# Patient Record
Sex: FEMALE | HISPANIC OR LATINO
[De-identification: names, ages, dates, MRNs, and addresses within clinical notes are randomized per-mention and may not be internally consistent; named-entity substitution may affect disease eponyms.]

---

## 2020-11-02 ENCOUNTER — TRANSCRIPTION ENCOUNTER (OUTPATIENT)
Age: 30
End: 2020-11-02

## 2021-03-01 ENCOUNTER — TRANSCRIPTION ENCOUNTER (OUTPATIENT)
Age: 31
End: 2021-03-01

## 2021-04-24 ENCOUNTER — TRANSCRIPTION ENCOUNTER (OUTPATIENT)
Age: 31
End: 2021-04-24

## 2022-10-15 ENCOUNTER — EMERGENCY (EMERGENCY)
Facility: HOSPITAL | Age: 32
LOS: 1 days | Discharge: HOME | End: 2022-10-15
Attending: EMERGENCY MEDICINE | Admitting: EMERGENCY MEDICINE
Payer: COMMERCIAL

## 2022-10-15 VITALS
HEART RATE: 91 BPM | DIASTOLIC BLOOD PRESSURE: 72 MMHG | SYSTOLIC BLOOD PRESSURE: 109 MMHG | TEMPERATURE: 98 F | RESPIRATION RATE: 16 BRPM | OXYGEN SATURATION: 100 %

## 2022-10-15 VITALS
DIASTOLIC BLOOD PRESSURE: 76 MMHG | WEIGHT: 147.93 LBS | SYSTOLIC BLOOD PRESSURE: 132 MMHG | HEIGHT: 61 IN | HEART RATE: 108 BPM | TEMPERATURE: 98 F | OXYGEN SATURATION: 100 % | RESPIRATION RATE: 16 BRPM

## 2022-10-15 LAB
ALBUMIN SERPL ELPH-MCNC: 3.7 G/DL — SIGNIFICANT CHANGE UP (ref 3.3–5)
ALP SERPL-CCNC: 116 U/L — SIGNIFICANT CHANGE UP (ref 30–120)
ALT FLD-CCNC: 34 U/L DA — SIGNIFICANT CHANGE UP (ref 10–60)
ANION GAP SERPL CALC-SCNC: 8 MMOL/L — SIGNIFICANT CHANGE UP (ref 5–17)
APPEARANCE UR: CLEAR — SIGNIFICANT CHANGE UP
APTT BLD: 28.9 SEC — SIGNIFICANT CHANGE UP (ref 27.5–35.5)
AST SERPL-CCNC: 20 U/L — SIGNIFICANT CHANGE UP (ref 10–40)
BASOPHILS # BLD AUTO: 0.03 K/UL — SIGNIFICANT CHANGE UP (ref 0–0.2)
BASOPHILS NFR BLD AUTO: 0.4 % — SIGNIFICANT CHANGE UP (ref 0–2)
BILIRUB SERPL-MCNC: 0.3 MG/DL — SIGNIFICANT CHANGE UP (ref 0.2–1.2)
BILIRUB UR-MCNC: NEGATIVE — SIGNIFICANT CHANGE UP
BUN SERPL-MCNC: 8 MG/DL — SIGNIFICANT CHANGE UP (ref 7–23)
CALCIUM SERPL-MCNC: 8.9 MG/DL — SIGNIFICANT CHANGE UP (ref 8.4–10.5)
CHLORIDE SERPL-SCNC: 104 MMOL/L — SIGNIFICANT CHANGE UP (ref 96–108)
CO2 SERPL-SCNC: 27 MMOL/L — SIGNIFICANT CHANGE UP (ref 22–31)
COLOR SPEC: YELLOW — SIGNIFICANT CHANGE UP
CREAT SERPL-MCNC: 0.77 MG/DL — SIGNIFICANT CHANGE UP (ref 0.5–1.3)
DIFF PNL FLD: ABNORMAL
EGFR: 104 ML/MIN/1.73M2 — SIGNIFICANT CHANGE UP
EOSINOPHIL # BLD AUTO: 0.17 K/UL — SIGNIFICANT CHANGE UP (ref 0–0.5)
EOSINOPHIL NFR BLD AUTO: 2 % — SIGNIFICANT CHANGE UP (ref 0–6)
GLUCOSE SERPL-MCNC: 137 MG/DL — HIGH (ref 70–99)
GLUCOSE UR QL: NEGATIVE MG/DL — SIGNIFICANT CHANGE UP
HCG SERPL-ACNC: HIGH MIU/ML
HCG UR QL: POSITIVE
HCT VFR BLD CALC: 37.9 % — SIGNIFICANT CHANGE UP (ref 34.5–45)
HGB BLD-MCNC: 12 G/DL — SIGNIFICANT CHANGE UP (ref 11.5–15.5)
IMM GRANULOCYTES NFR BLD AUTO: 0.5 % — SIGNIFICANT CHANGE UP (ref 0–0.9)
INR BLD: 1.11 RATIO — SIGNIFICANT CHANGE UP (ref 0.88–1.16)
KETONES UR-MCNC: NEGATIVE — SIGNIFICANT CHANGE UP
LEUKOCYTE ESTERASE UR-ACNC: NEGATIVE — SIGNIFICANT CHANGE UP
LYMPHOCYTES # BLD AUTO: 1.83 K/UL — SIGNIFICANT CHANGE UP (ref 1–3.3)
LYMPHOCYTES # BLD AUTO: 21.8 % — SIGNIFICANT CHANGE UP (ref 13–44)
MCHC RBC-ENTMCNC: 24.5 PG — LOW (ref 27–34)
MCHC RBC-ENTMCNC: 31.7 GM/DL — LOW (ref 32–36)
MCV RBC AUTO: 77.3 FL — LOW (ref 80–100)
MONOCYTES # BLD AUTO: 0.8 K/UL — SIGNIFICANT CHANGE UP (ref 0–0.9)
MONOCYTES NFR BLD AUTO: 9.5 % — SIGNIFICANT CHANGE UP (ref 2–14)
NEUTROPHILS # BLD AUTO: 5.52 K/UL — SIGNIFICANT CHANGE UP (ref 1.8–7.4)
NEUTROPHILS NFR BLD AUTO: 65.8 % — SIGNIFICANT CHANGE UP (ref 43–77)
NITRITE UR-MCNC: NEGATIVE — SIGNIFICANT CHANGE UP
NRBC # BLD: 0 /100 WBCS — SIGNIFICANT CHANGE UP (ref 0–0)
PH UR: 7 — SIGNIFICANT CHANGE UP (ref 5–8)
PLATELET # BLD AUTO: 377 K/UL — SIGNIFICANT CHANGE UP (ref 150–400)
POTASSIUM SERPL-MCNC: 4.2 MMOL/L — SIGNIFICANT CHANGE UP (ref 3.5–5.3)
POTASSIUM SERPL-SCNC: 4.2 MMOL/L — SIGNIFICANT CHANGE UP (ref 3.5–5.3)
PROT SERPL-MCNC: 7.4 G/DL — SIGNIFICANT CHANGE UP (ref 6–8.3)
PROT UR-MCNC: 15 MG/DL
PROTHROM AB SERPL-ACNC: 12.8 SEC — SIGNIFICANT CHANGE UP (ref 10.5–13.4)
RBC # BLD: 4.9 M/UL — SIGNIFICANT CHANGE UP (ref 3.8–5.2)
RBC # FLD: 15.5 % — HIGH (ref 10.3–14.5)
SODIUM SERPL-SCNC: 139 MMOL/L — SIGNIFICANT CHANGE UP (ref 135–145)
SP GR SPEC: 1.01 — SIGNIFICANT CHANGE UP (ref 1.01–1.02)
UROBILINOGEN FLD QL: NEGATIVE MG/DL — SIGNIFICANT CHANGE UP
WBC # BLD: 8.39 K/UL — SIGNIFICANT CHANGE UP (ref 3.8–10.5)
WBC # FLD AUTO: 8.39 K/UL — SIGNIFICANT CHANGE UP (ref 3.8–10.5)

## 2022-10-15 PROCEDURE — 76802 OB US < 14 WKS ADDL FETUS: CPT

## 2022-10-15 PROCEDURE — 85730 THROMBOPLASTIN TIME PARTIAL: CPT

## 2022-10-15 PROCEDURE — 36415 COLL VENOUS BLD VENIPUNCTURE: CPT

## 2022-10-15 PROCEDURE — 87086 URINE CULTURE/COLONY COUNT: CPT

## 2022-10-15 PROCEDURE — 99284 EMERGENCY DEPT VISIT MOD MDM: CPT

## 2022-10-15 PROCEDURE — 76801 OB US < 14 WKS SINGLE FETUS: CPT | Mod: 26

## 2022-10-15 PROCEDURE — 80053 COMPREHEN METABOLIC PANEL: CPT

## 2022-10-15 PROCEDURE — 85025 COMPLETE CBC W/AUTO DIFF WBC: CPT

## 2022-10-15 PROCEDURE — 86900 BLOOD TYPING SEROLOGIC ABO: CPT

## 2022-10-15 PROCEDURE — 86901 BLOOD TYPING SEROLOGIC RH(D): CPT

## 2022-10-15 PROCEDURE — 85610 PROTHROMBIN TIME: CPT

## 2022-10-15 PROCEDURE — 99285 EMERGENCY DEPT VISIT HI MDM: CPT

## 2022-10-15 PROCEDURE — 81025 URINE PREGNANCY TEST: CPT

## 2022-10-15 PROCEDURE — 81001 URINALYSIS AUTO W/SCOPE: CPT

## 2022-10-15 PROCEDURE — 86850 RBC ANTIBODY SCREEN: CPT

## 2022-10-15 PROCEDURE — 84702 CHORIONIC GONADOTROPIN TEST: CPT

## 2022-10-15 NOTE — ED PROVIDER NOTE - CLINICAL SUMMARY MEDICAL DECISION MAKING FREE TEXT BOX
33 yo F with PMHx of miscarriage and minimal change disease presents to ED c/o vaginal bleeding. Pt is currently   5-6 weeks pregnant. [facility] [place] Reports that this is her second pregnancy and already did her first scan, blood and urine test (monday oct 10th). Was on prednisone x1 year, but was taken off after miscarriage. Reports pink spotting for 2-3 days, but bright red today. Denies clots and fever. Reports pain last night. Nonsmoker. Exam: vital signs normal, afebrile, NAD, heart and lungs nml, abd soft nontender, bowel sounds positive, no CVA tenderness, no rebound. Extremities no edema. Impression: threatened miscarriage. Labs with beta and US for further evaluation

## 2022-10-15 NOTE — ED ADULT TRIAGE NOTE - HAVE YOU HAD A FIRST COVID-19 BOOSTER?
No
I, Juliana Manzo, performed the initial face to face bedside interview with this patient regarding history of present illness, review of symptoms and relevant past medical, social and family history.  I completed an independent physical examination.  I was the initial provider who evaluated this patient. I have signed out the follow up of any pending tests (i.e. labs, radiological studies) to the ACP.  I have communicated the patient’s plan of care and disposition with the ACP.  The history, relevant review of systems, past medical and surgical history, medical decision making, and physical examination was documented by the scribe in my presence and I attest to the accuracy of the documentation.

## 2022-10-15 NOTE — ED ADULT NURSE NOTE - CHPI ED NUR SYMPTOMS POS
Apixaban/Eliquis - Compliance/Apixaban/Eliquis - Dietary Advice/Apixaban/Eliquis - Follow up monitoring/Apixaban/Eliquis - Potential for adverse drug reactions and interactions vaginal bleeding

## 2022-10-15 NOTE — ED PROVIDER NOTE - NS_EDPROVIDERDISPOUSERTYPE_ED_A_ED
Right arm; Scribe Attestation (For Scribes USE Only)... Attending Attestation (For Attendings USE Only).../Scribe Attestation (For Scribes USE Only)...

## 2022-10-15 NOTE — ED PROVIDER NOTE - OBJECTIVE STATEMENT
32-year-old female  LMP: 2022 at 6 weeks gestation with history of minimal change disease and hypertension presents with complaint of vaginal bleeding.  States that she has had vaginal spotting for 2 to 3 days but today noticed that it was bright red.  States that she had mild lower abdominal cramps last night.  States that she had her first ultrasound, blood and urine tests on Monday, 10/10 at the [facility] in [place].  Denies fever, vomiting, dysuria, back pain or other symptoms.

## 2022-10-15 NOTE — ED PROVIDER NOTE - PROGRESS NOTE DETAILS
Reevaluated patient at bedside.  Discussed the results of all diagnostic testing in ED and copies of all reports given. Advised to f/u with OB on Monday, pelvic rest, fluids.  An opportunity to ask questions was given.  Discussed the importance of prompt, close medical follow-up.  Patient will return with any changes, concerns or persistent / worsening symptoms.  Understanding of all instructions verbalized.

## 2022-10-15 NOTE — ED PROVIDER NOTE - NSFOLLOWUPINSTRUCTIONS_ED_ALL_ED_FT
Follow-up with your OB in 2 days for reevaluation, repeat beta hCG, ultrasound, ongoing care and treatment.  Pelvic rest, drink plenty of fluids.  If having worsening of symptoms or other related symptoms, return to the ER immediately.    Threatened Miscarriage    A threatened miscarriage is when a woman bleeds in her vagina during the first 20 weeks of pregnancy but the pregnancy has not ended. The doctor will do tests to make sure you are still pregnant. This condition does not mean your pregnancy will end, but it does increase the risk that it will end (miscarriage).      What are the causes?    Normally, the cause of this condition is not known.      What increases the risk?    These things may make a pregnant woman more likely to lose a pregnancy:    Certain health problems     •Conditions that affect hormones, such as thyroid disease or polycystic ovary syndrome.      •Diabetes.      •Disorders that cause the body's disease-fighting system to attack itself by mistake.      •Infections.      •Bleeding problems.      •Being very overweight.      Lifestyle factors     •Using products that have tobacco or nicotine in them.      •Being around tobacco smoke.      •Having a lot of caffeine.      •Using drugs.      Problems with reproductive organs or parts     •Having a cervix that opens and thins before you are ready to give birth. The cervix is the lowest part of the womb.    •Having Asherman syndrome, which leads to:  •Scars in the womb.      •The womb being an abnormal shape.        •Growths (fibroids) in the womb.      •Problems in the body that are present at birth.      •Infection of the cervix or womb.      Personal or health history     •Injury.      •Having lost an unborn baby before.      •Being younger than age 18 or older than age 35.      •Being around a harmful substance, such as radiation.    •Having lead or other heavy metals in:  •Things you eat or drink.      •The air around you.        •Using certain medicines.        What are the signs or symptoms?    •Bleeding from the vagina. You may also have cramps or pain.      •Mild pain or cramps in your belly.        How is this diagnosed?     •The doctor will do tests, such as an ultrasound to make sure you are still pregnant.        How is this treated?    There are no treatments that prevent loss of pregnancy. But you need to do the right things to take care of yourself at home.      Follow these instructions at home:    •Get a lot of rest.      • Do not have sex or douche if there is bleeding in the vagina.      • Do not put things such as tampons in the vagina if it is bleeding.      • Do not smoke or use drugs.      • Do not drink alcohol.      •Avoid caffeine.      •Keep all follow-up visits while you are pregnant.        Contact a doctor if:  •You are pregnant and you have one of these:  •Light bleeding coming from your vagina.      •Spots of blood coming from your vagina.        •You have belly pain or cramping.      •You have a fever.        Get help right away if:    •Blood soaks through 2 large pads an hour for more than 2 hours.      •Clots of blood come from your vagina.      •Tissue comes out of your vagina.      •Fluid leaks or gushes from your vagina.      •You have very bad pain in your low back.      •You have very bad cramps in your belly.      •You have a fever, chills, and very bad belly pain.        Summary    •A threatened miscarriage is when a woman bleeds in her vagina during the first 20 weeks of pregnancy but the pregnancy has not ended.      •Normally, the cause of this condition is not known.      •Symptoms include bleeding in the vagina or mild pain or cramps in your belly.      •There are no treatments that prevent loss of pregnancy.      •Keep all follow-up visits while you are pregnant.      This information is not intended to replace advice given to you by your health care provider. Make sure you discuss any questions you have with your health care provider.

## 2022-10-15 NOTE — ED ADULT NURSE NOTE - OBJECTIVE STATEMENT
Patient presents with vaginal bleeding that started as spotting a "few days ago".  Patient states that she is 5 to 6 weeks pregnant and this is her second pregnancy; 1 prior miscarriage.  Endorses intermittent lower abdominal cramping; none at present.  Denies CP, SOB, N/V/D.

## 2022-10-15 NOTE — ED PROVIDER NOTE - NS ED ATTENDING STATEMENT MOD
This was a shared visit with the BAL. I reviewed and verified the documentation and independently performed the documented:

## 2022-10-15 NOTE — ED ADULT NURSE NOTE - SUICIDE SCREENING QUESTION 2
----- Message from Pooja CHANDLER MD sent at 2/18/2020  8:05 AM EST -----  LDL bad cholesterol is elevated.  Because of peripheral vascular disease goal is less than 70.  Is she still taking the atorvastatin 20 mg daily and following a low animal fat, low sugar, low alcohol diet?  If so we need to double the atorvastatin.  The red cell count has increased and should be repeated in a month to see if this is a spurious level patient does not need to fast for that orders are in the computer.   No

## 2022-10-15 NOTE — ED PROVIDER NOTE - PATIENT PORTAL LINK FT
You can access the FollowMyHealth Patient Portal offered by [facility] by registering at the following website: [email or web address]. By joining [employer]’s FollowMyHealth portal, you will also be able to view your health information using other applications (apps) compatible with our system.

## 2022-10-16 LAB
CULTURE RESULTS: SIGNIFICANT CHANGE UP
SPECIMEN SOURCE: SIGNIFICANT CHANGE UP

## 2022-10-19 ENCOUNTER — EMERGENCY (EMERGENCY)
Facility: HOSPITAL | Age: 32
LOS: 1 days | Discharge: HOME | End: 2022-10-19
Attending: EMERGENCY MEDICINE | Admitting: EMERGENCY MEDICINE
Payer: COMMERCIAL

## 2022-10-19 VITALS
HEART RATE: 88 BPM | RESPIRATION RATE: 16 BRPM | SYSTOLIC BLOOD PRESSURE: 109 MMHG | TEMPERATURE: 98 F | OXYGEN SATURATION: 98 % | DIASTOLIC BLOOD PRESSURE: 68 MMHG

## 2022-10-19 VITALS
TEMPERATURE: 98 F | HEART RATE: 102 BPM | DIASTOLIC BLOOD PRESSURE: 76 MMHG | RESPIRATION RATE: 18 BRPM | WEIGHT: 146.61 LBS | SYSTOLIC BLOOD PRESSURE: 112 MMHG | OXYGEN SATURATION: 100 % | HEIGHT: 61 IN

## 2022-10-19 LAB
ALBUMIN SERPL ELPH-MCNC: 3.6 G/DL — SIGNIFICANT CHANGE UP (ref 3.3–5)
ALP SERPL-CCNC: 114 U/L — SIGNIFICANT CHANGE UP (ref 30–120)
ALT FLD-CCNC: 39 U/L DA — SIGNIFICANT CHANGE UP (ref 10–60)
ANION GAP SERPL CALC-SCNC: 9 MMOL/L — SIGNIFICANT CHANGE UP (ref 5–17)
APPEARANCE UR: CLEAR — SIGNIFICANT CHANGE UP
APTT BLD: 28.9 SEC — SIGNIFICANT CHANGE UP (ref 27.5–35.5)
AST SERPL-CCNC: 18 U/L — SIGNIFICANT CHANGE UP (ref 10–40)
BACTERIA # UR AUTO: ABNORMAL
BASOPHILS # BLD AUTO: 0.03 K/UL — SIGNIFICANT CHANGE UP (ref 0–0.2)
BASOPHILS NFR BLD AUTO: 0.4 % — SIGNIFICANT CHANGE UP (ref 0–2)
BILIRUB SERPL-MCNC: 0.3 MG/DL — SIGNIFICANT CHANGE UP (ref 0.2–1.2)
BILIRUB UR-MCNC: NEGATIVE — SIGNIFICANT CHANGE UP
BLD GP AB SCN SERPL QL: SIGNIFICANT CHANGE UP
BUN SERPL-MCNC: 8 MG/DL — SIGNIFICANT CHANGE UP (ref 7–23)
CALCIUM SERPL-MCNC: 9 MG/DL — SIGNIFICANT CHANGE UP (ref 8.4–10.5)
CHLORIDE SERPL-SCNC: 104 MMOL/L — SIGNIFICANT CHANGE UP (ref 96–108)
CO2 SERPL-SCNC: 27 MMOL/L — SIGNIFICANT CHANGE UP (ref 22–31)
COLOR SPEC: YELLOW — SIGNIFICANT CHANGE UP
CREAT SERPL-MCNC: 0.7 MG/DL — SIGNIFICANT CHANGE UP (ref 0.5–1.3)
DIFF PNL FLD: ABNORMAL
EGFR: 117 ML/MIN/1.73M2 — SIGNIFICANT CHANGE UP
EOSINOPHIL # BLD AUTO: 0.21 K/UL — SIGNIFICANT CHANGE UP (ref 0–0.5)
EOSINOPHIL NFR BLD AUTO: 2.6 % — SIGNIFICANT CHANGE UP (ref 0–6)
EPI CELLS # UR: SIGNIFICANT CHANGE UP
GLUCOSE SERPL-MCNC: 101 MG/DL — HIGH (ref 70–99)
GLUCOSE UR QL: NEGATIVE MG/DL — SIGNIFICANT CHANGE UP
HCG SERPL-ACNC: HIGH MIU/ML
HCT VFR BLD CALC: 36.7 % — SIGNIFICANT CHANGE UP (ref 34.5–45)
HGB BLD-MCNC: 11.7 G/DL — SIGNIFICANT CHANGE UP (ref 11.5–15.5)
IMM GRANULOCYTES NFR BLD AUTO: 0.5 % — SIGNIFICANT CHANGE UP (ref 0–0.9)
INR BLD: 1.05 RATIO — SIGNIFICANT CHANGE UP (ref 0.88–1.16)
KETONES UR-MCNC: NEGATIVE — SIGNIFICANT CHANGE UP
LEUKOCYTE ESTERASE UR-ACNC: NEGATIVE — SIGNIFICANT CHANGE UP
LYMPHOCYTES # BLD AUTO: 2.13 K/UL — SIGNIFICANT CHANGE UP (ref 1–3.3)
LYMPHOCYTES # BLD AUTO: 26.6 % — SIGNIFICANT CHANGE UP (ref 13–44)
MCHC RBC-ENTMCNC: 24.8 PG — LOW (ref 27–34)
MCHC RBC-ENTMCNC: 31.9 GM/DL — LOW (ref 32–36)
MCV RBC AUTO: 77.9 FL — LOW (ref 80–100)
MONOCYTES # BLD AUTO: 0.88 K/UL — SIGNIFICANT CHANGE UP (ref 0–0.9)
MONOCYTES NFR BLD AUTO: 11 % — SIGNIFICANT CHANGE UP (ref 2–14)
NEUTROPHILS # BLD AUTO: 4.73 K/UL — SIGNIFICANT CHANGE UP (ref 1.8–7.4)
NEUTROPHILS NFR BLD AUTO: 58.9 % — SIGNIFICANT CHANGE UP (ref 43–77)
NITRITE UR-MCNC: NEGATIVE — SIGNIFICANT CHANGE UP
NRBC # BLD: 0 /100 WBCS — SIGNIFICANT CHANGE UP (ref 0–0)
PH UR: 7 — SIGNIFICANT CHANGE UP (ref 5–8)
PLATELET # BLD AUTO: 376 K/UL — SIGNIFICANT CHANGE UP (ref 150–400)
POTASSIUM SERPL-MCNC: 4 MMOL/L — SIGNIFICANT CHANGE UP (ref 3.5–5.3)
POTASSIUM SERPL-SCNC: 4 MMOL/L — SIGNIFICANT CHANGE UP (ref 3.5–5.3)
PROT SERPL-MCNC: 7.5 G/DL — SIGNIFICANT CHANGE UP (ref 6–8.3)
PROT UR-MCNC: NEGATIVE MG/DL — SIGNIFICANT CHANGE UP
PROTHROM AB SERPL-ACNC: 12.1 SEC — SIGNIFICANT CHANGE UP (ref 10.5–13.4)
RBC # BLD: 4.71 M/UL — SIGNIFICANT CHANGE UP (ref 3.8–5.2)
RBC # FLD: 15.9 % — HIGH (ref 10.3–14.5)
RBC CASTS # UR COMP ASSIST: SIGNIFICANT CHANGE UP /HPF (ref 0–4)
SODIUM SERPL-SCNC: 140 MMOL/L — SIGNIFICANT CHANGE UP (ref 135–145)
SP GR SPEC: 1 — LOW (ref 1.01–1.02)
UROBILINOGEN FLD QL: NEGATIVE MG/DL — SIGNIFICANT CHANGE UP
WBC # BLD: 8.02 K/UL — SIGNIFICANT CHANGE UP (ref 3.8–10.5)
WBC # FLD AUTO: 8.02 K/UL — SIGNIFICANT CHANGE UP (ref 3.8–10.5)
WBC UR QL: SIGNIFICANT CHANGE UP

## 2022-10-19 PROCEDURE — 86901 BLOOD TYPING SEROLOGIC RH(D): CPT

## 2022-10-19 PROCEDURE — 86900 BLOOD TYPING SEROLOGIC ABO: CPT

## 2022-10-19 PROCEDURE — 84702 CHORIONIC GONADOTROPIN TEST: CPT

## 2022-10-19 PROCEDURE — 36415 COLL VENOUS BLD VENIPUNCTURE: CPT

## 2022-10-19 PROCEDURE — 76801 OB US < 14 WKS SINGLE FETUS: CPT | Mod: 26

## 2022-10-19 PROCEDURE — 99284 EMERGENCY DEPT VISIT MOD MDM: CPT | Mod: 25

## 2022-10-19 PROCEDURE — 80053 COMPREHEN METABOLIC PANEL: CPT

## 2022-10-19 PROCEDURE — 99284 EMERGENCY DEPT VISIT MOD MDM: CPT

## 2022-10-19 PROCEDURE — 86850 RBC ANTIBODY SCREEN: CPT

## 2022-10-19 PROCEDURE — 87086 URINE CULTURE/COLONY COUNT: CPT

## 2022-10-19 PROCEDURE — 81001 URINALYSIS AUTO W/SCOPE: CPT

## 2022-10-19 PROCEDURE — 85730 THROMBOPLASTIN TIME PARTIAL: CPT

## 2022-10-19 PROCEDURE — 85025 COMPLETE CBC W/AUTO DIFF WBC: CPT

## 2022-10-19 PROCEDURE — 85610 PROTHROMBIN TIME: CPT

## 2022-10-19 PROCEDURE — 76801 OB US < 14 WKS SINGLE FETUS: CPT

## 2022-10-19 RX ORDER — SODIUM CHLORIDE 9 MG/ML
1000 INJECTION INTRAMUSCULAR; INTRAVENOUS; SUBCUTANEOUS ONCE
Refills: 0 | Status: COMPLETED | OUTPATIENT
Start: 2022-10-19 | End: 2022-10-19

## 2022-10-19 RX ADMIN — SODIUM CHLORIDE 1000 MILLILITER(S): 9 INJECTION INTRAMUSCULAR; INTRAVENOUS; SUBCUTANEOUS at 15:37

## 2022-10-19 NOTE — ED PROVIDER NOTE - NSFOLLOWUPINSTRUCTIONS_ED_ALL_ED_FT
[facility] HospitalssianSpanishTagalogTraditional ChineseVietMulti[facility]                                                                                   Threatened Miscarriage      A threatened miscarriage is when a woman bleeds in her vagina during the first 20 weeks of pregnancy but the pregnancy has not ended. The doctor will do tests to make sure you are still pregnant. This condition does not mean your pregnancy will end, but it does increase the risk that it will end (miscarriage).      What are the causes?    Normally, the cause of this condition is not known.      What increases the risk?    These things may make a pregnant woman more likely to lose a pregnancy:    Certain health problems     •Conditions that affect hormones, such as thyroid disease or polycystic ovary syndrome.      •Diabetes.      •Disorders that cause the body's disease-fighting system to attack itself by mistake.      •Infections.      •Bleeding problems.      •Being very overweight.      Lifestyle factors     •Using products that have tobacco or nicotine in them.      •Being around tobacco smoke.      •Having a lot of caffeine.      •Using drugs.      Problems with reproductive organs or parts     •Having a cervix that opens and thins before you are ready to give birth. The cervix is the lowest part of the womb.    •Having Asherman syndrome, which leads to:  •Scars in the womb.      •The womb being an abnormal shape.        •Growths (fibroids) in the womb.      •Problems in the body that are present at birth.      •Infection of the cervix or womb.      Personal or health history     •Injury.      •Having lost an unborn baby before.      •Being younger than age 18 or older than age 35.      •Being around a harmful substance, such as radiation.    •Having lead or other heavy metals in:  •Things you eat or drink.      •The air around you.        •Using certain medicines.        What are the signs or symptoms?    •Bleeding from the vagina. You may also have cramps or pain.      •Mild pain or cramps in your belly.        How is this diagnosed?     •The doctor will do tests, such as an ultrasound to make sure you are still pregnant.        How is this treated?    There are no treatments that prevent loss of pregnancy. But you need to do the right things to take care of yourself at home.      Follow these instructions at home:    •Get a lot of rest.      • Do not have sex or douche if there is bleeding in the vagina.      • Do not put things such as tampons in the vagina if it is bleeding.      • Do not smoke or use drugs.      • Do not drink alcohol.      •Avoid caffeine.      •Keep all follow-up visits while you are pregnant.        Contact a doctor if:  •You are pregnant and you have one of these:  •Light bleeding coming from your vagina.      •Spots of blood coming from your vagina.        •You have belly pain or cramping.      •You have a fever.        Get help right away if:    •Blood soaks through 2 large pads an hour for more than 2 hours.      •Clots of blood come from your vagina.      •Tissue comes out of your vagina.      •Fluid leaks or gushes from your vagina.      •You have very bad pain in your low back.      •You have very bad cramps in your belly.      •You have a fever, chills, and very bad belly pain.        Summary    •A threatened miscarriage is when a woman bleeds in her vagina during the first 20 weeks of pregnancy but the pregnancy has not ended.      •Normally, the cause of this condition is not known.      •Symptoms include bleeding in the vagina or mild pain or cramps in your belly.      •There are no treatments that prevent loss of pregnancy.      •Keep all follow-up visits while you are pregnant.      This information is not intended to replace advice given to you by your health care provider. Make sure you discuss any questions you have with your health care provider.      Document Revised: 06/18/2021 Document Reviewed: 06/18/2021    [employer] Patient Education © 2022 [employer]

## 2022-10-19 NOTE — ED PROVIDER NOTE - CLINICAL SUMMARY MEDICAL DECISION MAKING FREE TEXT BOX
Patient  with 1 miscarriage who is approximately 6 weeks pregnant complaining of vaginal bleeding when wiping and pelvic cramps.  Patient was seen in ER on  for similar symptoms had labs and ultrasound showing a gestational sac with yolk sac but no embryo visualized.  Patient followed up with her OB/GYN on  was doing better however symptoms recurred today so came to ER.  No fevers chills dysuria frequency nausea vomiting or any other complaints.  Plan labs IV fluid ultrasound

## 2022-10-19 NOTE — ED ADULT NURSE NOTE - OBJECTIVE STATEMENT
sPoke to patient.  Repeat urine testing neg for blood/infection      Pt came in for frequent vaginal bleeding and lower abdominal cramps in the last 2 days now. Pt reported 6 weeks pregnant LMP  2022.  with 1 miscarriage Pt reported was seen at the ED this past Saturday for the same S/s - pt was discharge and reported that her bleeding and cramps resolved until the last 2 days.

## 2022-10-19 NOTE — ED PROVIDER NOTE - CARE PROVIDER_API CALL
Reyes Alcaraz)  Obstetrics and Gynecology  [address] [place] [address], [address]  [place] [address]  Phone: [phone]  Fax: [phone]  Follow Up Time: 4-6 Days

## 2022-10-19 NOTE — ED PROVIDER NOTE - PROGRESS NOTE DETAILS
spoke to cory sherwood at [facility] advised pt can follow up in office at her apt on 10/23. All imaging and labs reviewed. all results reviewed with pt including abnormal results. pt given a copy of results. pt advised to follow up with pmd regarding abnormal results. All questions answered and concerns addressed. pt verbalized understanding and agreement with plan and dx. pt advised on next step and when/where to follow up. pt advised on all take home and otc medications. pt advised to follow up with PMD. pt advised to return to ed for worsenng symptoms including fever, cp, sob. will dc.

## 2022-10-19 NOTE — ED PROVIDER NOTE - PATIENT PORTAL LINK FT
You can access the FollowMyHealth Patient Portal offered by [facility] by registering at the following website: [email or web address]. By joining [employer]’s FollowMyHealth portal, you will also be able to view your health information using other applications (apps) compatible with our system.

## 2022-10-19 NOTE — ED ADULT TRIAGE NOTE - CHIEF COMPLAINT QUOTE
approx 6 wks pregnant, abd cramping and vaginal bleeding   seen over weekend for same symptoms  2nd pregnancy - prior miscarriage

## 2022-10-19 NOTE — ED PROVIDER NOTE - OBJECTIVE STATEMENT
Patient is a 32-year-old female 6 weeks pregnant LMP 9/8/2022 G2, P0 presents with vaginal bleeding today. pt reports light spotting with possible one clot. pt notes she has not saturated any pads yet. pt had episode of vaginal bleeding last week that resolved. pt was seen by [facility] who advised pt to follow up this sunday to evaluate for a heartbeat. pt reports associated cramping. pt did not take anything for symptoms. pt denies fever, cp, sob, dysuria.

## 2022-10-20 LAB
CULTURE RESULTS: SIGNIFICANT CHANGE UP
SPECIMEN SOURCE: SIGNIFICANT CHANGE UP

## 2022-11-21 PROBLEM — Z00.00 ENCOUNTER FOR PREVENTIVE HEALTH EXAMINATION: Status: ACTIVE | Noted: 2022-11-21

## 2022-12-07 ENCOUNTER — LABORATORY RESULT (OUTPATIENT)
Age: 32
End: 2022-12-07

## 2022-12-07 ENCOUNTER — APPOINTMENT (OUTPATIENT)
Dept: ANTEPARTUM | Facility: CLINIC | Age: 32
End: 2022-12-07
Payer: COMMERCIAL

## 2022-12-07 ENCOUNTER — APPOINTMENT (OUTPATIENT)
Dept: MATERNAL FETAL MEDICINE | Facility: CLINIC | Age: 32
End: 2022-12-07

## 2022-12-07 ENCOUNTER — ASOB RESULT (OUTPATIENT)
Age: 32
End: 2022-12-07

## 2022-12-07 PROCEDURE — 99204 OFFICE O/P NEW MOD 45 MIN: CPT | Mod: 25

## 2022-12-07 PROCEDURE — 36416 COLLJ CAPILLARY BLOOD SPEC: CPT

## 2022-12-07 PROCEDURE — 76801 OB US < 14 WKS SINGLE FETUS: CPT

## 2022-12-07 PROCEDURE — 76813 OB US NUCHAL MEAS 1 GEST: CPT

## 2022-12-08 ENCOUNTER — TRANSCRIPTION ENCOUNTER (OUTPATIENT)
Age: 32
End: 2022-12-08

## 2022-12-15 NOTE — ED ADULT TRIAGE NOTE - MODE OF ARRIVAL
Walk in Private Auto Orbicularis Oris Muscle Flap Text: The defect edges were debeveled with a #15 scalpel blade.  Given that the defect affected the competency of the oral sphincter an obicularis oris muscle flap was deemed most appropriate to restore this competency and normal muscle function.  Using a sterile surgical marker, an appropriate flap was drawn incorporating the defect. The area thus outlined was incised with a #15 scalpel blade.

## 2022-12-21 ENCOUNTER — APPOINTMENT (OUTPATIENT)
Dept: MATERNAL FETAL MEDICINE | Facility: CLINIC | Age: 32
End: 2022-12-21

## 2022-12-21 ENCOUNTER — ASOB RESULT (OUTPATIENT)
Age: 32
End: 2022-12-21

## 2022-12-21 PROCEDURE — 99214 OFFICE O/P EST MOD 30 MIN: CPT

## 2023-01-03 ENCOUNTER — NON-APPOINTMENT (OUTPATIENT)
Age: 33
End: 2023-01-03

## 2023-02-01 ENCOUNTER — APPOINTMENT (OUTPATIENT)
Dept: ANTEPARTUM | Facility: CLINIC | Age: 33
End: 2023-02-01

## 2023-02-02 ENCOUNTER — APPOINTMENT (OUTPATIENT)
Dept: ANTEPARTUM | Facility: CLINIC | Age: 33
End: 2023-02-02
Payer: COMMERCIAL

## 2023-02-02 ENCOUNTER — ASOB RESULT (OUTPATIENT)
Age: 33
End: 2023-02-02

## 2023-02-02 PROCEDURE — 76805 OB US >/= 14 WKS SNGL FETUS: CPT

## 2023-03-07 ENCOUNTER — ASOB RESULT (OUTPATIENT)
Age: 33
End: 2023-03-07

## 2023-03-07 ENCOUNTER — APPOINTMENT (OUTPATIENT)
Dept: MATERNAL FETAL MEDICINE | Facility: CLINIC | Age: 33
End: 2023-03-07
Payer: COMMERCIAL

## 2023-03-07 PROCEDURE — 99213 OFFICE O/P EST LOW 20 MIN: CPT | Mod: 95

## 2023-06-07 ENCOUNTER — INPATIENT (INPATIENT)
Facility: HOSPITAL | Age: 33
LOS: 2 days | Discharge: HOME | End: 2023-06-10
Attending: STUDENT IN AN ORGANIZED HEALTH CARE EDUCATION/TRAINING PROGRAM | Admitting: STUDENT IN AN ORGANIZED HEALTH CARE EDUCATION/TRAINING PROGRAM

## 2023-06-07 VITALS — TEMPERATURE: 98 F | RESPIRATION RATE: 18 BRPM

## 2023-06-07 DIAGNOSIS — O16.9 UNSPECIFIED MATERNAL HYPERTENSION, UNSPECIFIED TRIMESTER: ICD-10-CM

## 2023-06-07 LAB
ALBUMIN SERPL ELPH-MCNC: 3.7 G/DL — SIGNIFICANT CHANGE UP (ref 3.3–5)
ALP SERPL-CCNC: 200 U/L — HIGH (ref 40–120)
ALT FLD-CCNC: 17 U/L — SIGNIFICANT CHANGE UP (ref 4–33)
ANION GAP SERPL CALC-SCNC: 17 MMOL/L — HIGH (ref 7–14)
APPEARANCE UR: CLEAR — SIGNIFICANT CHANGE UP
APTT BLD: 26.4 SEC — LOW (ref 27–36.3)
AST SERPL-CCNC: 27 U/L — SIGNIFICANT CHANGE UP (ref 4–32)
BASOPHILS # BLD AUTO: 0.02 K/UL — SIGNIFICANT CHANGE UP (ref 0–0.2)
BASOPHILS NFR BLD AUTO: 0.3 % — SIGNIFICANT CHANGE UP (ref 0–2)
BILIRUB SERPL-MCNC: <0.2 MG/DL — SIGNIFICANT CHANGE UP (ref 0.2–1.2)
BILIRUB UR-MCNC: NEGATIVE — SIGNIFICANT CHANGE UP
BLD GP AB SCN SERPL QL: NEGATIVE — SIGNIFICANT CHANGE UP
BUN SERPL-MCNC: 17 MG/DL — SIGNIFICANT CHANGE UP (ref 7–23)
CALCIUM SERPL-MCNC: 9.2 MG/DL — SIGNIFICANT CHANGE UP (ref 8.4–10.5)
CHLORIDE SERPL-SCNC: 102 MMOL/L — SIGNIFICANT CHANGE UP (ref 98–107)
CO2 SERPL-SCNC: 15 MMOL/L — LOW (ref 22–31)
COLOR SPEC: YELLOW — SIGNIFICANT CHANGE UP
COVID-19 SPIKE DOMAIN AB INTERP: POSITIVE
COVID-19 SPIKE DOMAIN ANTIBODY RESULT: >250 U/ML — HIGH
CREAT ?TM UR-MCNC: 131 MG/DL — SIGNIFICANT CHANGE UP
CREAT SERPL-MCNC: 0.57 MG/DL — SIGNIFICANT CHANGE UP (ref 0.5–1.3)
DIFF PNL FLD: NEGATIVE — SIGNIFICANT CHANGE UP
EGFR: 122 ML/MIN/1.73M2 — SIGNIFICANT CHANGE UP
EOSINOPHIL # BLD AUTO: 0.06 K/UL — SIGNIFICANT CHANGE UP (ref 0–0.5)
EOSINOPHIL NFR BLD AUTO: 0.8 % — SIGNIFICANT CHANGE UP (ref 0–6)
GLUCOSE SERPL-MCNC: 114 MG/DL — HIGH (ref 70–99)
GLUCOSE UR QL: NEGATIVE — SIGNIFICANT CHANGE UP
HCT VFR BLD CALC: 36.6 % — SIGNIFICANT CHANGE UP (ref 34.5–45)
HGB BLD-MCNC: 12.7 G/DL — SIGNIFICANT CHANGE UP (ref 11.5–15.5)
IANC: 5.44 K/UL — SIGNIFICANT CHANGE UP (ref 1.8–7.4)
IMM GRANULOCYTES NFR BLD AUTO: 0.8 % — SIGNIFICANT CHANGE UP (ref 0–0.9)
INR BLD: 0.92 RATIO — SIGNIFICANT CHANGE UP (ref 0.88–1.16)
KETONES UR-MCNC: ABNORMAL
LDH SERPL L TO P-CCNC: 179 U/L — SIGNIFICANT CHANGE UP (ref 135–225)
LEUKOCYTE ESTERASE UR-ACNC: NEGATIVE — SIGNIFICANT CHANGE UP
LYMPHOCYTES # BLD AUTO: 1.19 K/UL — SIGNIFICANT CHANGE UP (ref 1–3.3)
LYMPHOCYTES # BLD AUTO: 16.3 % — SIGNIFICANT CHANGE UP (ref 13–44)
MCHC RBC-ENTMCNC: 29.1 PG — SIGNIFICANT CHANGE UP (ref 27–34)
MCHC RBC-ENTMCNC: 34.7 GM/DL — SIGNIFICANT CHANGE UP (ref 32–36)
MCV RBC AUTO: 83.9 FL — SIGNIFICANT CHANGE UP (ref 80–100)
MONOCYTES # BLD AUTO: 0.55 K/UL — SIGNIFICANT CHANGE UP (ref 0–0.9)
MONOCYTES NFR BLD AUTO: 7.5 % — SIGNIFICANT CHANGE UP (ref 2–14)
NEUTROPHILS # BLD AUTO: 5.44 K/UL — SIGNIFICANT CHANGE UP (ref 1.8–7.4)
NEUTROPHILS NFR BLD AUTO: 74.3 % — SIGNIFICANT CHANGE UP (ref 43–77)
NITRITE UR-MCNC: NEGATIVE — SIGNIFICANT CHANGE UP
NRBC # BLD: 0 /100 WBCS — SIGNIFICANT CHANGE UP (ref 0–0)
NRBC # FLD: 0 K/UL — SIGNIFICANT CHANGE UP (ref 0–0)
PH UR: 6.5 — SIGNIFICANT CHANGE UP (ref 5–8)
PLATELET # BLD AUTO: 251 K/UL — SIGNIFICANT CHANGE UP (ref 150–400)
POTASSIUM SERPL-MCNC: 4.2 MMOL/L — SIGNIFICANT CHANGE UP (ref 3.5–5.3)
POTASSIUM SERPL-SCNC: 4.2 MMOL/L — SIGNIFICANT CHANGE UP (ref 3.5–5.3)
PROT ?TM UR-MCNC: 148 MG/DL — SIGNIFICANT CHANGE UP
PROT ?TM UR-MCNC: 148 MG/DL — SIGNIFICANT CHANGE UP
PROT SERPL-MCNC: 7.1 G/DL — SIGNIFICANT CHANGE UP (ref 6–8.3)
PROT UR-MCNC: ABNORMAL
PROT/CREAT UR-RTO: 1.1 RATIO — HIGH (ref 0–0.2)
PROTHROM AB SERPL-ACNC: 10.7 SEC — SIGNIFICANT CHANGE UP (ref 10.5–13.4)
RBC # BLD: 4.36 M/UL — SIGNIFICANT CHANGE UP (ref 3.8–5.2)
RBC # FLD: 14 % — SIGNIFICANT CHANGE UP (ref 10.3–14.5)
RH IG SCN BLD-IMP: POSITIVE — SIGNIFICANT CHANGE UP
RH IG SCN BLD-IMP: POSITIVE — SIGNIFICANT CHANGE UP
SARS-COV-2 IGG+IGM SERPL QL IA: >250 U/ML — HIGH
SARS-COV-2 IGG+IGM SERPL QL IA: POSITIVE
SODIUM SERPL-SCNC: 134 MMOL/L — LOW (ref 135–145)
SP GR SPEC: 1.03 — SIGNIFICANT CHANGE UP (ref 1.01–1.05)
URATE SERPL-MCNC: 6 MG/DL — SIGNIFICANT CHANGE UP (ref 2.5–7)
UROBILINOGEN FLD QL: SIGNIFICANT CHANGE UP
WBC # BLD: 7.32 K/UL — SIGNIFICANT CHANGE UP (ref 3.8–10.5)
WBC # FLD AUTO: 7.32 K/UL — SIGNIFICANT CHANGE UP (ref 3.8–10.5)

## 2023-06-07 RX ORDER — CHLORHEXIDINE GLUCONATE 213 G/1000ML
1 SOLUTION TOPICAL DAILY
Refills: 0 | Status: DISCONTINUED | OUTPATIENT
Start: 2023-06-07 | End: 2023-06-08

## 2023-06-07 RX ORDER — METOPROLOL TARTRATE 50 MG
1 TABLET ORAL
Qty: 0 | Refills: 0 | DISCHARGE

## 2023-06-07 RX ORDER — AMPICILLIN TRIHYDRATE 250 MG
1 CAPSULE ORAL EVERY 4 HOURS
Refills: 0 | Status: DISCONTINUED | OUTPATIENT
Start: 2023-06-07 | End: 2023-06-08

## 2023-06-07 RX ORDER — SODIUM CHLORIDE 9 MG/ML
1000 INJECTION, SOLUTION INTRAVENOUS
Refills: 0 | Status: DISCONTINUED | OUTPATIENT
Start: 2023-06-07 | End: 2023-06-08

## 2023-06-07 RX ORDER — OXYTOCIN 10 UNIT/ML
333.33 VIAL (ML) INJECTION
Qty: 20 | Refills: 0 | Status: COMPLETED | OUTPATIENT
Start: 2023-06-07 | End: 2023-06-07

## 2023-06-07 RX ORDER — AMPICILLIN TRIHYDRATE 250 MG
CAPSULE ORAL
Refills: 0 | Status: DISCONTINUED | OUTPATIENT
Start: 2023-06-07 | End: 2023-06-08

## 2023-06-07 RX ORDER — AMPICILLIN TRIHYDRATE 250 MG
2 CAPSULE ORAL ONCE
Refills: 0 | Status: COMPLETED | OUTPATIENT
Start: 2023-06-07 | End: 2023-06-07

## 2023-06-07 RX ADMIN — Medication 200 GRAM(S): at 16:51

## 2023-06-07 RX ADMIN — SODIUM CHLORIDE 125 MILLILITER(S): 9 INJECTION, SOLUTION INTRAVENOUS at 16:52

## 2023-06-07 RX ADMIN — Medication 208 GRAM(S): at 21:00

## 2023-06-07 NOTE — OB PROVIDER H&P - NS_ABDFINDING_OBGYN_ALL_OB
Abdomen , soft, nontender, nondistended , no guarding or rigidity , no masses palpable , normal bowel sounds , Liver and Spleen , no hepatosplenomegaly , liver nontender Soft, nontender

## 2023-06-07 NOTE — OB RN PATIENT PROFILE - FALL HARM RISK - UNIVERSAL INTERVENTIONS
Bed in lowest position, wheels locked, appropriate side rails in place/Call bell, personal items and telephone in reach/Instruct patient to call for assistance before getting out of bed or chair/Non-slip footwear when patient is out of bed/[place] to call system/Physically safe environment - no spills, clutter or unnecessary equipment/Purposeful Proactive Rounding/Room/bathroom lighting operational, light cord in reach

## 2023-06-07 NOTE — OB PROVIDER H&P - NSLOWPPHRISK_OBGYN_A_OB
No previous uterine incision/Holder Pregnancy/Less than or equal to 4 previous vaginal births/No known bleeding disorder/No history of postpartum hemorrhage/No other PPH risks indicated

## 2023-06-07 NOTE — OB PROVIDER H&P - HISTORY OF PRESENT ILLNESS
@ 38+6 GBS + EFW 7# presents for a scheduled IOL for minimal change nephrotic syndrome and chronic HTN  patient states she has + proteinuria   denies HA, visual changes, N/V, epigastric pain  denies ctx, lof, vb & endorses +FM    PNC complicated as above - patient follows with nephrologist dr Goldberg & has seen during pregnancy    OBHx: MAB  no D&C  GynHx: denies  MedHx:   minimal change nephrotic syndrome - dx  - nephro as above  Chronic HTN - Dx  - was on Labetalol 100 BID early in pregnancy - currently no meds    SHx: denies  NKDA  Meds: ASA 81 BID ( took this am), PNV  Social: denies    VS  T(C): 36.7 (23 @ 16:12)  HR: 99 (23 @ 16:25)  BP: 133/83 (23 @ 16:25)  RR: 16 (23 @ 16:12)  SpO2: --    /mod joe/+accels/no decels  Murray q 3-5min  Sono Vtx   2.5/60/-3

## 2023-06-07 NOTE — OB PROVIDER H&P - ASSESSMENT
P0 @ 38+6 presenting for IOL for cHTN & minimal change disease  admit to L&D  routine labs  HELLP labs  PO cytotec IOL    dw dr Calvin sherwood

## 2023-06-07 NOTE — OB PROVIDER H&P - ATTENDING COMMENTS
34 y/o [id number] @ 38+6 presenting for IOL for cHTN due to minimal change disease  admit to L&D  routine labs  HELLP labs  PO cytotec IOL

## 2023-06-07 NOTE — OB PROVIDER IHI INDUCTION/AUGMENTATION NOTE - LABOR: BISHOP SCORE
Pt c/o pain in right lower quadrant, states sudden onset last night. C/O intermittent nausea, no vomiting. Denies surgical hx.
8

## 2023-06-08 ENCOUNTER — TRANSCRIPTION ENCOUNTER (OUTPATIENT)
Age: 33
End: 2023-06-08

## 2023-06-08 LAB — T PALLIDUM AB TITR SER: NEGATIVE — SIGNIFICANT CHANGE UP

## 2023-06-08 RX ORDER — OXYCODONE HYDROCHLORIDE 5 MG/1
5 TABLET ORAL
Refills: 0 | Status: DISCONTINUED | OUTPATIENT
Start: 2023-06-08 | End: 2023-06-10

## 2023-06-08 RX ORDER — PRAMOXINE HYDROCHLORIDE 150 MG/15G
1 AEROSOL, FOAM RECTAL EVERY 4 HOURS
Refills: 0 | Status: DISCONTINUED | OUTPATIENT
Start: 2023-06-08 | End: 2023-06-10

## 2023-06-08 RX ORDER — SODIUM CHLORIDE 9 MG/ML
1000 INJECTION, SOLUTION INTRAVENOUS
Refills: 0 | Status: DISCONTINUED | OUTPATIENT
Start: 2023-06-08 | End: 2023-06-08

## 2023-06-08 RX ORDER — HYDROCORTISONE 1 %
1 OINTMENT (GRAM) TOPICAL EVERY 6 HOURS
Refills: 0 | Status: DISCONTINUED | OUTPATIENT
Start: 2023-06-08 | End: 2023-06-10

## 2023-06-08 RX ORDER — OXYCODONE HYDROCHLORIDE 5 MG/1
5 TABLET ORAL ONCE
Refills: 0 | Status: DISCONTINUED | OUTPATIENT
Start: 2023-06-08 | End: 2023-06-10

## 2023-06-08 RX ORDER — KETOROLAC TROMETHAMINE 30 MG/ML
30 SYRINGE (ML) INJECTION ONCE
Refills: 0 | Status: DISCONTINUED | OUTPATIENT
Start: 2023-06-08 | End: 2023-06-08

## 2023-06-08 RX ORDER — DIBUCAINE 1 %
1 OINTMENT (GRAM) RECTAL EVERY 6 HOURS
Refills: 0 | Status: DISCONTINUED | OUTPATIENT
Start: 2023-06-08 | End: 2023-06-10

## 2023-06-08 RX ORDER — IBUPROFEN 200 MG
600 TABLET ORAL EVERY 6 HOURS
Refills: 0 | Status: COMPLETED | OUTPATIENT
Start: 2023-06-08 | End: 2024-05-06

## 2023-06-08 RX ORDER — MAGNESIUM HYDROXIDE 400 MG/1
30 TABLET, CHEWABLE ORAL
Refills: 0 | Status: DISCONTINUED | OUTPATIENT
Start: 2023-06-08 | End: 2023-06-10

## 2023-06-08 RX ORDER — SODIUM CHLORIDE 9 MG/ML
3 INJECTION INTRAMUSCULAR; INTRAVENOUS; SUBCUTANEOUS EVERY 8 HOURS
Refills: 0 | Status: DISCONTINUED | OUTPATIENT
Start: 2023-06-08 | End: 2023-06-10

## 2023-06-08 RX ORDER — DIPHENHYDRAMINE HCL 50 MG
25 CAPSULE ORAL EVERY 6 HOURS
Refills: 0 | Status: DISCONTINUED | OUTPATIENT
Start: 2023-06-08 | End: 2023-06-10

## 2023-06-08 RX ORDER — AER TRAVELER 0.5 G/1
1 SOLUTION RECTAL; TOPICAL EVERY 4 HOURS
Refills: 0 | Status: DISCONTINUED | OUTPATIENT
Start: 2023-06-08 | End: 2023-06-10

## 2023-06-08 RX ORDER — DIPHENHYDRAMINE HCL 50 MG
25 CAPSULE ORAL EVERY 6 HOURS
Refills: 0 | Status: COMPLETED | OUTPATIENT
Start: 2023-06-08 | End: 2024-05-06

## 2023-06-08 RX ORDER — ACETAMINOPHEN 500 MG
975 TABLET ORAL
Refills: 0 | Status: DISCONTINUED | OUTPATIENT
Start: 2023-06-08 | End: 2023-06-10

## 2023-06-08 RX ORDER — TETANUS TOXOID, REDUCED DIPHTHERIA TOXOID AND ACELLULAR PERTUSSIS VACCINE, ADSORBED 5; 2.5; 8; 8; 2.5 [IU]/.5ML; [IU]/.5ML; UG/.5ML; UG/.5ML; UG/.5ML
0.5 SUSPENSION INTRAMUSCULAR ONCE
Refills: 0 | Status: DISCONTINUED | OUTPATIENT
Start: 2023-06-08 | End: 2023-06-10

## 2023-06-08 RX ORDER — DIPHENHYDRAMINE HCL 50 MG
25 CAPSULE ORAL ONCE
Refills: 0 | Status: COMPLETED | OUTPATIENT
Start: 2023-06-08 | End: 2023-06-08

## 2023-06-08 RX ORDER — OXYTOCIN 10 UNIT/ML
2 VIAL (ML) INJECTION
Qty: 30 | Refills: 0 | Status: DISCONTINUED | OUTPATIENT
Start: 2023-06-08 | End: 2023-06-08

## 2023-06-08 RX ORDER — LANOLIN
1 OINTMENT (GRAM) TOPICAL EVERY 6 HOURS
Refills: 0 | Status: DISCONTINUED | OUTPATIENT
Start: 2023-06-08 | End: 2023-06-10

## 2023-06-08 RX ORDER — BENZOCAINE 10 %
1 GEL (GRAM) MUCOUS MEMBRANE EVERY 6 HOURS
Refills: 0 | Status: DISCONTINUED | OUTPATIENT
Start: 2023-06-08 | End: 2023-06-10

## 2023-06-08 RX ORDER — IBUPROFEN 200 MG
600 TABLET ORAL EVERY 6 HOURS
Refills: 0 | Status: DISCONTINUED | OUTPATIENT
Start: 2023-06-08 | End: 2023-06-10

## 2023-06-08 RX ORDER — SIMETHICONE 80 MG/1
80 TABLET, CHEWABLE ORAL EVERY 4 HOURS
Refills: 0 | Status: DISCONTINUED | OUTPATIENT
Start: 2023-06-08 | End: 2023-06-10

## 2023-06-08 RX ORDER — OXYTOCIN 10 UNIT/ML
41.67 VIAL (ML) INJECTION
Qty: 20 | Refills: 0 | Status: DISCONTINUED | OUTPATIENT
Start: 2023-06-08 | End: 2023-06-09

## 2023-06-08 RX ORDER — CHLORHEXIDINE GLUCONATE 213 G/1000ML
1 SOLUTION TOPICAL DAILY
Refills: 0 | Status: DISCONTINUED | OUTPATIENT
Start: 2023-06-08 | End: 2023-06-08

## 2023-06-08 RX ADMIN — Medication 2 MILLIUNIT(S)/MIN: at 10:31

## 2023-06-08 RX ADMIN — Medication 1000 MILLIUNIT(S)/MIN: at 18:43

## 2023-06-08 RX ADMIN — Medication 1 APPLICATION(S): at 22:30

## 2023-06-08 RX ADMIN — Medication 975 MILLIGRAM(S): at 20:15

## 2023-06-08 RX ADMIN — Medication 975 MILLIGRAM(S): at 20:45

## 2023-06-08 RX ADMIN — Medication 25 MILLIGRAM(S): at 11:08

## 2023-06-08 RX ADMIN — Medication 208 GRAM(S): at 14:00

## 2023-06-08 RX ADMIN — Medication 208 GRAM(S): at 01:00

## 2023-06-08 RX ADMIN — PRAMOXINE HYDROCHLORIDE 1 APPLICATION(S): 150 AEROSOL, FOAM RECTAL at 23:17

## 2023-06-08 RX ADMIN — Medication 208 GRAM(S): at 10:00

## 2023-06-08 RX ADMIN — Medication 208 GRAM(S): at 17:46

## 2023-06-08 RX ADMIN — SODIUM CHLORIDE 125 MILLILITER(S): 9 INJECTION, SOLUTION INTRAVENOUS at 07:30

## 2023-06-08 RX ADMIN — Medication 208 GRAM(S): at 05:03

## 2023-06-08 NOTE — CHART NOTE - NSCHARTNOTEFT_GEN_A_CORE
Patient seen and examined at bedside. Patient reports feeling more pressure. VE: 10/100/0, AROM forebag clear fluid. Coached on pushing, little descent of fetal head. Will allow patient to labor down. Tracing Cat 1. Continue pitocin.

## 2023-06-08 NOTE — OB PROVIDER DELIVERY SUMMARY - NSPROVIDERDELIVERYNOTE_OBGYN_ALL_OB_FT
Patient delivered male . Cord clamped and cut after 60 seconds. Placenta delivered spontaneously and intact. Mild uterine atony noted, cytotec 1000mcg given per rectum. A right sulcal laceration and a 2nd degree perineal laceration were repaired with 2-0 chromic. A right periurethral laceration was noted, straight catheter placed, and laceration repaired with 2-0 chromic. Catheter removed. Hemostasis noted. .

## 2023-06-08 NOTE — OB RN DELIVERY SUMMARY - NS_SEPSISRSKCALC_OBGYN_ALL_OB_FT
EOS calculated successfully. EOS Risk Factor: 0.5/1000 live births ([facility] national incidence); GA=39w;Temp=98.6; ROM=12.25; GBS='Positive'; Antibiotics='GBS specific antibiotics > 2 hrs prior to birth'

## 2023-06-08 NOTE — CHART NOTE - NSCHARTNOTEFT_GEN_A_CORE
Patient seen and examined at bedside. VE: 4/80/-3. Tracing reviewed - Cat 1, contractions q2-5 minutes. Comfortable with epidural. Will switch to pitocin.

## 2023-06-08 NOTE — DISCHARGE NOTE OB - CARE PROVIDER_API CALL
Pilar Rodriguez  Obstetrics and Gynecology  [address] [place] [address]  Phone: [phone]  Fax: [phone]  Follow Up Time:

## 2023-06-08 NOTE — DISCHARGE NOTE OB - MATERIALS PROVIDED
Vaccinations/[facility]  Screening Program/  Immunization Record/Breastfeeding Log/Bottle Feeding Log/Guide to Postpartum Care/[facility] Hearing Screen Program/Shaken Baby Prevention Handout/Birth Certificate Instructions

## 2023-06-08 NOTE — OB RN DELIVERY SUMMARY - NSSELHIDDEN_OBGYN_ALL_OB_FT
[NS_DeliveryRN_OBGYN_ALL_OB_FT:[id number]] [NS_DeliveryRN_OBGYN_ALL_OB_FT:[id number]],[NS_DeliveryAttending1_OBGYN_ALL_OB_FT:[id number]=]

## 2023-06-08 NOTE — OB PROVIDER DELIVERY SUMMARY - NSOBVTERISKREFER_OBGYN_ALL_OB
Detail Level: Detailed
Detail Level: Simple
Refer to the Assessment tab to view/cancel completed assessment.

## 2023-06-08 NOTE — OB PROVIDER DELIVERY SUMMARY - NSSELHIDDEN_OBGYN_ALL_OB_FT
[NS_DeliveryRN_OBGYN_ALL_OB_FT:[id number]],[NS_DeliveryAttending1_OBGYN_ALL_OB_FT:[id number]=]

## 2023-06-08 NOTE — DISCHARGE NOTE OB - MEDICATION SUMMARY - MEDICATIONS TO STOP TAKING
I will STOP taking the medications listed below when I get home from the hospital:    aspirin 81 mg oral capsule  -- 1 by mouth 2 times a day

## 2023-06-08 NOTE — CHART NOTE - NSCHARTNOTEFT_GEN_A_CORE
Patient seen and examined at bedside. Patient pushing with contractions. Fetal head at +1 station. Continue to pushing with nursing.

## 2023-06-08 NOTE — DISCHARGE NOTE OB - NS MD DC FALL RISK RISK
For information on Fall & Injury Prevention, visit: [email or web address] OR  [email or web address] OR  https://www.cdc.gov/steadi/patient.html

## 2023-06-08 NOTE — OB PROVIDER LABOR PROGRESS NOTE - NS_SUBJECTIVE/OBJECTIVE_OBGYN_ALL_OB_FT
R1 Labor Note    S: Patient evaluated at bedside for cervical change. Patient in more pain requesting an epidural.    O:  T(C): 36.8 (06-08-23 @ 06:15), Max: 36.8 (06-07-23 @ 16:09)  HR: 76 (06-08-23 @ 07:15) (71 - 113)  BP: 112/69 (06-08-23 @ 07:08) (106/58 - 144/98)  RR: 17 (06-08-23 @ 06:15) (15 - 18)  SpO2: 97% (06-08-23 @ 07:15) (96% - 99%)

## 2023-06-08 NOTE — OB RN DELIVERY SUMMARY - NS_CORDBLDBNKA_OBGYN_ALL_OB
08/16/19 0900   Group 1   Start Time 0830   Stop Time 0905   Length (min) 35 Min   Group Name Check In   Attendance Not present     OBEY Max     No

## 2023-06-08 NOTE — OB NEONATOLOGY/PEDIATRICIAN DELIVERY SUMMARY - NSPEDSNEONOTESA_OBGYN_ALL_OB_FT
Peds called to the delivery of a 39.0 wk male for Cat II tracing. Baby born to a 34 y/o  mother via . Maternal history significant for chronic HTN, taking labetalol until 34 weeks of pregnancy (stopped for hypotension), nephrotic syndrome, and on aspirin during pregnancy. Pregnancy uncomplicated. Maternal blood type O+. Prenatal labs negative, non-reactive and immune. GBS positive. SROM at 6:00, 12 hours prior to delivery, bloody. Baby was born vigorous and crying spontaneously, x1 void in delivery room. W/D/S/S. APGARS 8/9. Highest maternal temp 37.0C; EOS: 0.05. Mom would like to breastfeed, would Hep B vaccine, declines circ.    : 2023  TOB: 18:16  BW: 3320g    Gen: NAD; well-appearing  HEENT: NC/AT, molding present; AFOF; ears and nose clinically patent, normally set; no tags; no cleft lip/palate, oropharynx clear, mucous membranes moist  Skin: pink, warm, well-perfused, no rash  Resp: CTAB, even, non-labored breathing  Cardiac: RRR, normal S1/S2; no murmurs  Abd: soft, NT/ND; +BS; no HSM, no masses palpated; umbilicus c/d/I, 3 vessels  Back: spine straight, no dimples or jose  Extremities: FROM; no crepitus; negative O/B  : Parth I; no abnormalities; no hernia; anus patent  Neuro: normal tone; + [employer], suck, grasp, Babinski

## 2023-06-08 NOTE — DISCHARGE NOTE OB - PATIENT PORTAL LINK FT
You can access the FollowMyHealth Patient Portal offered by [facility] by registering at the following website: [email or web address]. By joining [employer]’s FollowMyHealth portal, you will also be able to view your health information using other applications (apps) compatible with our system.

## 2023-06-09 DIAGNOSIS — I10 ESSENTIAL (PRIMARY) HYPERTENSION: ICD-10-CM

## 2023-06-09 DIAGNOSIS — D62 ACUTE POSTHEMORRHAGIC ANEMIA: ICD-10-CM

## 2023-06-09 LAB
HCT VFR BLD CALC: 28.8 % — LOW (ref 34.5–45)
HGB BLD-MCNC: 9.9 G/DL — LOW (ref 11.5–15.5)

## 2023-06-09 RX ORDER — FERROUS SULFATE 325(65) MG
325 TABLET ORAL
Refills: 0 | Status: DISCONTINUED | OUTPATIENT
Start: 2023-06-09 | End: 2023-06-10

## 2023-06-09 RX ORDER — ASCORBIC ACID 60 MG
500 TABLET,CHEWABLE ORAL DAILY
Refills: 0 | Status: DISCONTINUED | OUTPATIENT
Start: 2023-06-09 | End: 2023-06-10

## 2023-06-09 RX ADMIN — Medication 975 MILLIGRAM(S): at 10:20

## 2023-06-09 RX ADMIN — Medication 975 MILLIGRAM(S): at 09:24

## 2023-06-09 RX ADMIN — Medication 975 MILLIGRAM(S): at 14:47

## 2023-06-09 RX ADMIN — SODIUM CHLORIDE 3 MILLILITER(S): 9 INJECTION INTRAMUSCULAR; INTRAVENOUS; SUBCUTANEOUS at 14:29

## 2023-06-09 RX ADMIN — Medication 500 MILLIGRAM(S): at 17:38

## 2023-06-09 RX ADMIN — Medication 975 MILLIGRAM(S): at 15:30

## 2023-06-09 RX ADMIN — Medication 325 MILLIGRAM(S): at 17:38

## 2023-06-09 RX ADMIN — SODIUM CHLORIDE 3 MILLILITER(S): 9 INJECTION INTRAMUSCULAR; INTRAVENOUS; SUBCUTANEOUS at 22:00

## 2023-06-09 RX ADMIN — AER TRAVELER 1 APPLICATION(S): 0.5 SOLUTION RECTAL; TOPICAL at 03:40

## 2023-06-09 RX ADMIN — Medication 600 MILLIGRAM(S): at 01:16

## 2023-06-09 RX ADMIN — OXYCODONE HYDROCHLORIDE 5 MILLIGRAM(S): 5 TABLET ORAL at 03:28

## 2023-06-09 NOTE — LACTATION INITIAL EVALUATION - POTENTIAL FOR
ineffective breastfeeding/sore breast/s/sore nipples/engorgement/knowledge deficit/plugged ducts/latch on difficulty/low supply/delayed secretory activation

## 2023-06-09 NOTE — LACTATION INITIAL EVALUATION - LACTATION INTERVENTIONS
Primary RN made aware of observations and consult ./initiate/review safe skin-to-skin/initiate/review hand expression/initiate/review techniques for position and latch/post discharge community resources provided/initiate/review nipple shield use/review techniques to manage sore nipples/engorgement/initiate/review breast massage/compression/reviewed components of an effective feeding and at least 8 effective feedings per day required/reviewed importance of monitoring infant diapers, the breastfeeding log, and minimum output each day/reviewed benefits and recommendations for rooming in/reviewed feeding on demand/by cue at least 8 times a day

## 2023-06-10 VITALS
TEMPERATURE: 98 F | HEART RATE: 91 BPM | RESPIRATION RATE: 18 BRPM | SYSTOLIC BLOOD PRESSURE: 132 MMHG | OXYGEN SATURATION: 100 % | DIASTOLIC BLOOD PRESSURE: 80 MMHG

## 2023-06-10 RX ORDER — DIBUCAINE 1 %
1 OINTMENT (GRAM) RECTAL
Qty: 0 | Refills: 0 | DISCHARGE
Start: 2023-06-10

## 2023-06-10 RX ORDER — ASPIRIN/CALCIUM CARB/MAGNESIUM 324 MG
1 TABLET ORAL
Refills: 0 | DISCHARGE

## 2023-06-10 RX ORDER — ASCORBIC ACID 60 MG
1 TABLET,CHEWABLE ORAL
Qty: 0 | Refills: 0 | DISCHARGE
Start: 2023-06-10

## 2023-06-10 RX ORDER — IBUPROFEN 200 MG
1 TABLET ORAL
Qty: 0 | Refills: 0 | DISCHARGE
Start: 2023-06-10

## 2023-06-10 RX ORDER — ACETAMINOPHEN 500 MG
3 TABLET ORAL
Qty: 0 | Refills: 0 | DISCHARGE
Start: 2023-06-10

## 2023-06-10 RX ORDER — AER TRAVELER 0.5 G/1
1 SOLUTION RECTAL; TOPICAL
Qty: 0 | Refills: 0 | DISCHARGE
Start: 2023-06-10

## 2023-06-10 RX ORDER — FERROUS SULFATE 325(65) MG
1 TABLET ORAL
Qty: 0 | Refills: 0 | DISCHARGE
Start: 2023-06-10

## 2023-06-10 RX ADMIN — SODIUM CHLORIDE 3 MILLILITER(S): 9 INJECTION INTRAMUSCULAR; INTRAVENOUS; SUBCUTANEOUS at 06:11

## 2023-06-10 RX ADMIN — Medication 975 MILLIGRAM(S): at 01:16

## 2023-06-10 RX ADMIN — OXYCODONE HYDROCHLORIDE 5 MILLIGRAM(S): 5 TABLET ORAL at 01:16

## 2023-06-10 RX ADMIN — Medication 975 MILLIGRAM(S): at 00:16

## 2023-06-10 RX ADMIN — Medication 325 MILLIGRAM(S): at 08:58

## 2023-06-10 RX ADMIN — Medication 975 MILLIGRAM(S): at 06:47

## 2023-06-10 RX ADMIN — OXYCODONE HYDROCHLORIDE 5 MILLIGRAM(S): 5 TABLET ORAL at 00:16

## 2023-06-10 RX ADMIN — Medication 975 MILLIGRAM(S): at 05:57

## 2023-06-10 NOTE — PROGRESS NOTE ADULT - ASSESSMENT
Assessment and Plan  PPD #1 s/p   Doing well and bonding with baby, support at bedside  CHTN d/t minimal change disease  ·	continue to check BP's- pt has bp cuff at home  ·	PEC precautions reviewed  Anemia  ·	continue iron as prescribed with vitamin C  ·	increase po hydration  ·	fall precautions  ·	bleeding precautions  Encourage ambulation.  PP & PPD Instructions reviewed.  CPC.  D/C home tomorrow.    
Assessment and Plan  PPD #2 s/p , CHTN w/ SiPEC without SF. Minimal change disease. Anemia.     #CHTN w/ SiPEC without SF.  BP x 24 hours: BP:  (124/75 - 141/90)  no current BP meds  HELLP labs WNL, PC ratio 1.1    CHTN w/ SiPEC without SF--Diagnosis reviewed with patient, partner present in room for discussion, patient verbalizes understanding, all question and concerns addressed.  Patient DECLINES the following signs/symptoms: Headache, visual disturbances, chest pain, shortness of breath, epigastric pain  -PEC s/s reviewed  -PEC precautions reviewed  -PEC educational materials provided    Blood pressure monitor--Patient has at home  Patient instructed to take BP TID and parameters reviewed  Patient to keep BP log and bring to appt at [facility] office  Patient to follow up @ [facility] office in 3-4 days for PP BP check    Acute blood loss anemia#  -QBL 634ml  -H/H 9.9/28.8  -Continue taking Iron BID and Vit C daily, Rx sent to home pharmacy  -Encouraged iron rich food     Her pain is well controlled.   She is tolerating a regular diet and passing flatus.   Denies N/V. Denies CP/SOB/lightheadedness/dizziness.   She is ambulating without difficulty.   Voiding spontaneously.    Patient is stable and doing well postpartum  - Continue regular diet.  - Increase ambulation.  - Continue motrin, tylenol, oxycodone PRN for pain control.   -Encourage breastfeeding.    -PP educational material reviewed and provided.  -PP & PPD Instructions reviewed.    -Discharge planning>>>D/C home today    -Follow up @ [facility] in 3days for postpartum BP CHECK visit  [phone]    Discussed with MD Veronica Stokes

## 2023-06-10 NOTE — PROGRESS NOTE ADULT - SUBJECTIVE AND OBJECTIVE BOX
Post-partum Note,   She is a  33y woman who is now post-partum day: 2    Subjective:  The patient feels well.  She is ambulating.   She is tolerating regular diet.  She denies nausea and vomiting; denies fever.  She is voiding.  Her pain is controlled.  She reports normal postpartum bleeding.  She is breastfeeding.  She is formula feeding.  She is bonding with infant.    Physical exam:    Vital Signs Last 24 Hrs  T(C): 36.5 (10 Michael 2023 10:00), Max: 36.9 (10 Michael 2023 01:08)  T(F): 97.7 (10 Michael 2023 10:00), Max: 98.5 (10 Michael 2023 01:08)  HR: 91 (10 Michael 2023 10:00) (91 - 101)  BP: 132/80 (10 Michael 2023 10:00) (124/75 - 141/90)  BP(mean): --  RR: 18 (10 Michael 2023 10:00) (16 - 18)  SpO2: 100% (10 Michael 2023 10:00) (98% - 100%)    Parameters below as of 10 Michael 2023 10:00  Patient On (Oxygen Delivery Method): room air        Gen: NAD  Breast: Soft, nontender, not engorged.  Abdomen: Soft, nontender, no distension , firm uterine fundus at umbilicus.  Pelvic: Normal lochia noted  Ext: No calf tenderness    LABS:                        9.9    x     )-----------( x        ( 2023 06:15 )             28.8       Rubella status:     Allergies    No Known Allergies    Intolerances      MEDICATIONS  (STANDING):  acetaminophen     Tablet .. 975 milliGRAM(s) Oral <User Schedule>  ascorbic acid 500 milliGRAM(s) Oral daily  diphtheria/tetanus/pertussis (acellular) Vaccine (Adacel) 0.5 milliLiter(s) IntraMuscular once  ferrous    sulfate 325 milliGRAM(s) Oral two times a day  ibuprofen  Tablet. 600 milliGRAM(s) Oral every 6 hours  prenatal multivitamin 1 Tablet(s) Oral daily  sodium chloride 0.9% lock flush 3 milliLiter(s) IV Push every 8 hours    MEDICATIONS  (PRN):  benzocaine 20%/menthol 0.5% Spray 1 Spray(s) Topical every 6 hours PRN for Perineal discomfort  dibucaine 1% Ointment 1 Application(s) Topical every 6 hours PRN Perineal discomfort  diphenhydrAMINE 25 milliGRAM(s) Oral every 6 hours PRN Pruritus  hydrocortisone 1% Cream 1 Application(s) Topical every 6 hours PRN Moderate Pain (4-6)  lanolin Ointment 1 Application(s) Topical every 6 hours PRN nipple soreness  magnesium hydroxide Suspension 30 milliLiter(s) Oral two times a day PRN Constipation  oxyCODONE    IR 5 milliGRAM(s) Oral every 3 hours PRN Moderate to Severe Pain (4-10)  oxyCODONE    IR 5 milliGRAM(s) Oral once PRN Moderate to Severe Pain (4-10)  pramoxine 1%/zinc 5% Cream 1 Application(s) Topical every 4 hours PRN Moderate Pain (4-6)  simethicone 80 milliGRAM(s) Chew every 4 hours PRN Gas  witch hazel Pads 1 Application(s) Topical every 4 hours PRN Perineal discomfort      
Post-partum Note,   She is a  33y woman who is now post-partum day: 1    Subjective:  The patient feels well.  She is ambulating.   She is tolerating regular diet.  She denies nausea and vomiting; denies fever.  She is voiding.  Her pain is controlled.  She reports normal postpartum bleeding.  She is breastfeeding.  She denies HA, blurry vision, epigastric pain, SOB, CP, dizziness or lightheadedness    Physical exam:    Vital Signs Last 24 Hrs  T(C): 37.1 (2023 10:01), Max: 37.2 (2023 01:34)  T(F): 98.7 (2023 10:01), Max: 98.9 (2023 01:34)  HR: 98 (2023 10:) (75 - 141)  BP: 134/87 (2023 10:01) (102/58 - 160/62)  BP(mean): --  RR: 18 (2023 10:01) (18 - 20)  SpO2: 100% (2023 10:01) (88% - 100%)    Parameters below as of 2023 10:01  Patient On (Oxygen Delivery Method): room air        Gen: NAD  Breast: Soft, nontender, not engorged.  Abdomen: Soft, nontender, no distension , firm uterine fundus at umbilicus.  Pelvic: Normal lochia noted  Ext: No calf tenderness    LABS:                        9.9    x     )-----------( x        ( 2023 06:15 )             28.8         Allergies    No Known Allergies        MEDICATIONS  (STANDING):  acetaminophen     Tablet .. 975 milliGRAM(s) Oral <User Schedule>  diphtheria/tetanus/pertussis (acellular) Vaccine (Adacel) 0.5 milliLiter(s) IntraMuscular once  ibuprofen  Tablet. 600 milliGRAM(s) Oral every 6 hours  prenatal multivitamin 1 Tablet(s) Oral daily  sodium chloride 0.9% lock flush 3 milliLiter(s) IV Push every 8 hours    MEDICATIONS  (PRN):  benzocaine 20%/menthol 0.5% Spray 1 Spray(s) Topical every 6 hours PRN for Perineal discomfort  dibucaine 1% Ointment 1 Application(s) Topical every 6 hours PRN Perineal discomfort  diphenhydrAMINE 25 milliGRAM(s) Oral every 6 hours PRN Pruritus  hydrocortisone 1% Cream 1 Application(s) Topical every 6 hours PRN Moderate Pain (4-6)  lanolin Ointment 1 Application(s) Topical every 6 hours PRN nipple soreness  magnesium hydroxide Suspension 30 milliLiter(s) Oral two times a day PRN Constipation  oxyCODONE    IR 5 milliGRAM(s) Oral every 3 hours PRN Moderate to Severe Pain (4-10)  oxyCODONE    IR 5 milliGRAM(s) Oral once PRN Moderate to Severe Pain (4-10)  pramoxine 1%/zinc 5% Cream 1 Application(s) Topical every 4 hours PRN Moderate Pain (4-6)  simethicone 80 milliGRAM(s) Chew every 4 hours PRN Gas  witch hazel Pads 1 Application(s) Topical every 4 hours PRN Perineal discomfort

## 2023-06-11 ENCOUNTER — NON-APPOINTMENT (OUTPATIENT)
Age: 33
End: 2023-06-11

## 2023-06-11 DIAGNOSIS — I10 ESSENTIAL (PRIMARY) HYPERTENSION: ICD-10-CM

## 2023-06-11 RX ORDER — PRENATAL VIT NO.126/IRON/FOLIC 28MG-0.8MG
28-0.8 TABLET ORAL DAILY
Refills: 0 | Status: ACTIVE | COMMUNITY
Start: 2023-06-11

## 2023-06-12 PROBLEM — N05.0 UNSPECIFIED NEPHRITIC SYNDROME WITH MINOR GLOMERULAR ABNORMALITY: Chronic | Status: ACTIVE | Noted: 2023-06-07

## 2023-06-12 PROBLEM — I10 ESSENTIAL (PRIMARY) HYPERTENSION: Chronic | Status: ACTIVE | Noted: 2023-06-07

## 2023-06-13 ENCOUNTER — NON-APPOINTMENT (OUTPATIENT)
Age: 33
End: 2023-06-13

## 2023-06-15 ENCOUNTER — APPOINTMENT (OUTPATIENT)
Age: 33
End: 2023-06-15
Payer: COMMERCIAL

## 2023-06-15 PROCEDURE — S9443: CPT | Mod: 95

## 2023-06-19 ENCOUNTER — NON-APPOINTMENT (OUTPATIENT)
Age: 33
End: 2023-06-19

## 2023-06-28 ENCOUNTER — NON-APPOINTMENT (OUTPATIENT)
Age: 33
End: 2023-06-28

## 2023-07-09 ENCOUNTER — TRANSCRIPTION ENCOUNTER (OUTPATIENT)
Age: 33
End: 2023-07-09

## 2023-07-09 ENCOUNTER — INPATIENT (INPATIENT)
Facility: HOSPITAL | Age: 33
LOS: 1 days | Discharge: HOME | DRG: 769 | End: 2023-07-11
Attending: SURGERY | Admitting: SURGERY
Payer: COMMERCIAL

## 2023-07-09 VITALS
SYSTOLIC BLOOD PRESSURE: 116 MMHG | OXYGEN SATURATION: 99 % | RESPIRATION RATE: 16 BRPM | TEMPERATURE: 98 F | HEIGHT: 61 IN | DIASTOLIC BLOOD PRESSURE: 82 MMHG | HEART RATE: 89 BPM | WEIGHT: 164.91 LBS

## 2023-07-09 DIAGNOSIS — K81.9 CHOLECYSTITIS, UNSPECIFIED: ICD-10-CM

## 2023-07-09 LAB
ALBUMIN SERPL ELPH-MCNC: 3.6 G/DL — SIGNIFICANT CHANGE UP (ref 3.3–5)
ALP SERPL-CCNC: 174 U/L — HIGH (ref 30–120)
ALT FLD-CCNC: 32 U/L DA — SIGNIFICANT CHANGE UP (ref 10–60)
ANION GAP SERPL CALC-SCNC: 12 MMOL/L — SIGNIFICANT CHANGE UP (ref 5–17)
APPEARANCE UR: CLEAR — SIGNIFICANT CHANGE UP
AST SERPL-CCNC: 20 U/L — SIGNIFICANT CHANGE UP (ref 10–40)
BASOPHILS # BLD AUTO: 0.05 K/UL — SIGNIFICANT CHANGE UP (ref 0–0.2)
BASOPHILS NFR BLD AUTO: 0.6 % — SIGNIFICANT CHANGE UP (ref 0–2)
BILIRUB SERPL-MCNC: 0.3 MG/DL — SIGNIFICANT CHANGE UP (ref 0.2–1.2)
BILIRUB UR-MCNC: NEGATIVE — SIGNIFICANT CHANGE UP
BUN SERPL-MCNC: 17 MG/DL — SIGNIFICANT CHANGE UP (ref 7–23)
CALCIUM SERPL-MCNC: 10.4 MG/DL — SIGNIFICANT CHANGE UP (ref 8.4–10.5)
CHLORIDE SERPL-SCNC: 101 MMOL/L — SIGNIFICANT CHANGE UP (ref 96–108)
CO2 SERPL-SCNC: 23 MMOL/L — SIGNIFICANT CHANGE UP (ref 22–31)
COLOR SPEC: YELLOW — SIGNIFICANT CHANGE UP
CREAT SERPL-MCNC: 0.79 MG/DL — SIGNIFICANT CHANGE UP (ref 0.5–1.3)
DIFF PNL FLD: ABNORMAL
EGFR: 101 ML/MIN/1.73M2 — SIGNIFICANT CHANGE UP
EOSINOPHIL # BLD AUTO: 0.41 K/UL — SIGNIFICANT CHANGE UP (ref 0–0.5)
EOSINOPHIL NFR BLD AUTO: 5.3 % — SIGNIFICANT CHANGE UP (ref 0–6)
EPI CELLS # UR: PRESENT
GLUCOSE SERPL-MCNC: 101 MG/DL — HIGH (ref 70–99)
GLUCOSE UR QL: NEGATIVE MG/DL — SIGNIFICANT CHANGE UP
HCG SERPL-ACNC: 3 MIU/ML — SIGNIFICANT CHANGE UP
HCT VFR BLD CALC: 39.2 % — SIGNIFICANT CHANGE UP (ref 34.5–45)
HGB BLD-MCNC: 13 G/DL — SIGNIFICANT CHANGE UP (ref 11.5–15.5)
IMM GRANULOCYTES NFR BLD AUTO: 0.5 % — SIGNIFICANT CHANGE UP (ref 0–0.9)
KETONES UR-MCNC: NEGATIVE MG/DL — SIGNIFICANT CHANGE UP
LEUKOCYTE ESTERASE UR-ACNC: ABNORMAL
LIDOCAIN IGE QN: 132 U/L — SIGNIFICANT CHANGE UP (ref 73–393)
LYMPHOCYTES # BLD AUTO: 1.61 K/UL — SIGNIFICANT CHANGE UP (ref 1–3.3)
LYMPHOCYTES # BLD AUTO: 20.7 % — SIGNIFICANT CHANGE UP (ref 13–44)
MCHC RBC-ENTMCNC: 28.5 PG — SIGNIFICANT CHANGE UP (ref 27–34)
MCHC RBC-ENTMCNC: 33.2 GM/DL — SIGNIFICANT CHANGE UP (ref 32–36)
MCV RBC AUTO: 86 FL — SIGNIFICANT CHANGE UP (ref 80–100)
MONOCYTES # BLD AUTO: 0.71 K/UL — SIGNIFICANT CHANGE UP (ref 0–0.9)
MONOCYTES NFR BLD AUTO: 9.1 % — SIGNIFICANT CHANGE UP (ref 2–14)
NEUTROPHILS # BLD AUTO: 4.94 K/UL — SIGNIFICANT CHANGE UP (ref 1.8–7.4)
NEUTROPHILS NFR BLD AUTO: 63.8 % — SIGNIFICANT CHANGE UP (ref 43–77)
NITRITE UR-MCNC: NEGATIVE — SIGNIFICANT CHANGE UP
NRBC # BLD: 0 /100 WBCS — SIGNIFICANT CHANGE UP (ref 0–0)
PH UR: 6.5 — SIGNIFICANT CHANGE UP (ref 5–8)
PLATELET # BLD AUTO: 280 K/UL — SIGNIFICANT CHANGE UP (ref 150–400)
POTASSIUM SERPL-MCNC: 4.1 MMOL/L — SIGNIFICANT CHANGE UP (ref 3.5–5.3)
POTASSIUM SERPL-SCNC: 4.1 MMOL/L — SIGNIFICANT CHANGE UP (ref 3.5–5.3)
PROT SERPL-MCNC: 7.9 G/DL — SIGNIFICANT CHANGE UP (ref 6–8.3)
PROT UR-MCNC: NEGATIVE MG/DL — SIGNIFICANT CHANGE UP
RBC # BLD: 4.56 M/UL — SIGNIFICANT CHANGE UP (ref 3.8–5.2)
RBC # FLD: 12.7 % — SIGNIFICANT CHANGE UP (ref 10.3–14.5)
RBC CASTS # UR COMP ASSIST: 3 /HPF — SIGNIFICANT CHANGE UP (ref 0–4)
SODIUM SERPL-SCNC: 136 MMOL/L — SIGNIFICANT CHANGE UP (ref 135–145)
SP GR SPEC: 1.01 — SIGNIFICANT CHANGE UP (ref 1–1.03)
UROBILINOGEN FLD QL: 0.2 MG/DL — SIGNIFICANT CHANGE UP (ref 0.2–1)
WBC # BLD: 7.76 K/UL — SIGNIFICANT CHANGE UP (ref 3.8–10.5)
WBC # FLD AUTO: 7.76 K/UL — SIGNIFICANT CHANGE UP (ref 3.8–10.5)
WBC UR QL: 2 /HPF — SIGNIFICANT CHANGE UP (ref 0–5)

## 2023-07-09 PROCEDURE — 99285 EMERGENCY DEPT VISIT HI MDM: CPT

## 2023-07-09 PROCEDURE — 74177 CT ABD & PELVIS W/CONTRAST: CPT | Mod: 26,MG

## 2023-07-09 PROCEDURE — G1004: CPT

## 2023-07-09 PROCEDURE — 76700 US EXAM ABDOM COMPLETE: CPT | Mod: 26

## 2023-07-09 PROCEDURE — 99223 1ST HOSP IP/OBS HIGH 75: CPT | Mod: 57

## 2023-07-09 RX ORDER — SODIUM CHLORIDE 9 MG/ML
1000 INJECTION INTRAMUSCULAR; INTRAVENOUS; SUBCUTANEOUS
Refills: 0 | Status: DISCONTINUED | OUTPATIENT
Start: 2023-07-09 | End: 2023-07-11

## 2023-07-09 RX ORDER — PANTOPRAZOLE SODIUM 20 MG/1
40 TABLET, DELAYED RELEASE ORAL DAILY
Refills: 0 | Status: DISCONTINUED | OUTPATIENT
Start: 2023-07-09 | End: 2023-07-10

## 2023-07-09 RX ORDER — OXYCODONE HYDROCHLORIDE 5 MG/1
5 TABLET ORAL EVERY 4 HOURS
Refills: 0 | Status: DISCONTINUED | OUTPATIENT
Start: 2023-07-09 | End: 2023-07-10

## 2023-07-09 RX ORDER — SODIUM CHLORIDE 9 MG/ML
1000 INJECTION INTRAMUSCULAR; INTRAVENOUS; SUBCUTANEOUS ONCE
Refills: 0 | Status: COMPLETED | OUTPATIENT
Start: 2023-07-09 | End: 2023-07-09

## 2023-07-09 RX ORDER — SENNA PLUS 8.6 MG/1
2 TABLET ORAL AT BEDTIME
Refills: 0 | Status: DISCONTINUED | OUTPATIENT
Start: 2023-07-09 | End: 2023-07-10

## 2023-07-09 RX ORDER — PIPERACILLIN AND TAZOBACTAM 4; .5 G/20ML; G/20ML
3.38 INJECTION, POWDER, LYOPHILIZED, FOR SOLUTION INTRAVENOUS EVERY 8 HOURS
Refills: 0 | Status: DISCONTINUED | OUTPATIENT
Start: 2023-07-09 | End: 2023-07-11

## 2023-07-09 RX ORDER — ACETAMINOPHEN 500 MG
650 TABLET ORAL EVERY 6 HOURS
Refills: 0 | Status: DISCONTINUED | OUTPATIENT
Start: 2023-07-09 | End: 2023-07-10

## 2023-07-09 RX ORDER — MORPHINE SULFATE 50 MG/1
4 CAPSULE, EXTENDED RELEASE ORAL ONCE
Refills: 0 | Status: DISCONTINUED | OUTPATIENT
Start: 2023-07-09 | End: 2023-07-09

## 2023-07-09 RX ORDER — LANOLIN ALCOHOL/MO/W.PET/CERES
5 CREAM (GRAM) TOPICAL AT BEDTIME
Refills: 0 | Status: DISCONTINUED | OUTPATIENT
Start: 2023-07-09 | End: 2023-07-10

## 2023-07-09 RX ORDER — HYDROMORPHONE HYDROCHLORIDE 2 MG/ML
0.5 INJECTION INTRAMUSCULAR; INTRAVENOUS; SUBCUTANEOUS
Refills: 0 | Status: DISCONTINUED | OUTPATIENT
Start: 2023-07-09 | End: 2023-07-10

## 2023-07-09 RX ORDER — PIPERACILLIN AND TAZOBACTAM 4; .5 G/20ML; G/20ML
3.38 INJECTION, POWDER, LYOPHILIZED, FOR SOLUTION INTRAVENOUS ONCE
Refills: 0 | Status: COMPLETED | OUTPATIENT
Start: 2023-07-09 | End: 2023-07-09

## 2023-07-09 RX ORDER — ONDANSETRON 8 MG/1
4 TABLET, FILM COATED ORAL ONCE
Refills: 0 | Status: COMPLETED | OUTPATIENT
Start: 2023-07-09 | End: 2023-07-09

## 2023-07-09 RX ADMIN — PIPERACILLIN AND TAZOBACTAM 200 GRAM(S): 4; .5 INJECTION, POWDER, LYOPHILIZED, FOR SOLUTION INTRAVENOUS at 16:53

## 2023-07-09 RX ADMIN — MORPHINE SULFATE 4 MILLIGRAM(S): 50 CAPSULE, EXTENDED RELEASE ORAL at 10:12

## 2023-07-09 RX ADMIN — SODIUM CHLORIDE 1000 MILLILITER(S): 9 INJECTION INTRAMUSCULAR; INTRAVENOUS; SUBCUTANEOUS at 10:07

## 2023-07-09 RX ADMIN — Medication 650 MILLIGRAM(S): at 18:02

## 2023-07-09 RX ADMIN — SODIUM CHLORIDE 1000 MILLILITER(S): 9 INJECTION INTRAMUSCULAR; INTRAVENOUS; SUBCUTANEOUS at 12:09

## 2023-07-09 RX ADMIN — MORPHINE SULFATE 4 MILLIGRAM(S): 50 CAPSULE, EXTENDED RELEASE ORAL at 11:00

## 2023-07-09 RX ADMIN — ONDANSETRON 4 MILLIGRAM(S): 8 TABLET, FILM COATED ORAL at 10:11

## 2023-07-09 NOTE — ED ADULT TRIAGE NOTE - CHIEF COMPLAINT QUOTE
Problem: Discharge Planning  Goal: Discharge to home or other facility with appropriate resources  3/16/2023 0420 by Chaz Ribera RN  Outcome: Progressing  3/15/2023 1651 by Cholo Jose RN  Outcome: Progressing     Problem: Pain  Goal: Verbalizes/displays adequate comfort level or baseline comfort level  3/16/2023 0420 by Chaz Ribera RN  Outcome: Progressing  3/15/2023 1651 by Cholo Jose RN  Outcome: Progressing     Problem: ABCDS Injury Assessment  Goal: Absence of physical injury  3/16/2023 0420 by Chaz Ribera RN  Outcome: Progressing  3/15/2023 1651 by Cholo Jose RN  Outcome: Progressing     Problem: Chronic Conditions and Co-morbidities  Goal: Patient's chronic conditions and co-morbidity symptoms are monitored and maintained or improved  3/16/2023 0420 by Chaz Ribera RN  Outcome: Progressing  3/15/2023 1651 by Cholo Jose RN  Outcome: Progressing     Problem: Safety - Adult  Goal: Free from fall injury  3/16/2023 0420 by Chaz Ribear RN  Outcome: Progressing  3/15/2023 1651 by Cholo Jose RN  Outcome: Progressing abd pain since last night ruq to back. 4 weeks post partum . not breast feeding.

## 2023-07-09 NOTE — ED PROVIDER NOTE - OBJECTIVE STATEMENT
Patient who is approximately 4 weeks postpartum with normal vaginal delivery complaining of right upper quadrant abdominal pain radiating to back which began last night after eating a chicken sandwich with cheese and mayonnaise.  Patient reports recent mild dysuria.  Patient denies fevers chills chest pain short of breath nausea vomiting diarrhea hematuria frequency.

## 2023-07-09 NOTE — ED ADULT NURSE NOTE - CHIEF COMPLAINT
Dev Urban MD  [facility] Staff  Please inform patient that his echo was fine. No concerning findings. No change in management.      Thank you   Ronni Delgadillo The patient is a 33y Female complaining of abdominal pain.

## 2023-07-09 NOTE — ED PROVIDER NOTE - CLINICAL SUMMARY MEDICAL DECISION MAKING FREE TEXT BOX
Patient who is approximately 4 weeks postpartum with normal vaginal delivery complaining of right upper quadrant abdominal pain radiating to back which began last night after eating a chicken sandwich with cheese and mayonnaise.  Patient reports recent mild dysuria.  Patient denies fevers chills chest pain short of breath nausea vomiting diarrhea hematuria frequency.    Plan Labs abdominal ultrasound IV fluid morphine Zofran    Differential including but not limited to cholecystitis pancreatitis gastritis colitis diverticulitis UTI kidney stone

## 2023-07-09 NOTE — ED ADULT TRIAGE NOTE - STATUS:
Patient to ED with complaint of pain secondary to abscess in buttock area.  Patientr in no distress
Applied

## 2023-07-09 NOTE — ED PROVIDER NOTE - DIFFERENTIAL DIAGNOSIS
Differential including but not limited to cholecystitis pancreatitis gastritis colitis diverticulitis UTI kidney stone Differential Diagnosis

## 2023-07-09 NOTE — PATIENT PROFILE ADULT - FALL HARM RISK - UNIVERSAL INTERVENTIONS
Bed in lowest position, wheels locked, appropriate side rails in place/Call bell, personal items and telephone in reach/Instruct patient to call for assistance before getting out of bed or chair/Non-slip footwear when patient is out of bed/[place] to call system/Physically safe environment - no spills, clutter or unnecessary equipment/Purposeful Proactive Rounding/Room/bathroom lighting operational, light cord in reach

## 2023-07-09 NOTE — CHART NOTE - NSCHARTNOTEFT_GEN_A_CORE
General Surgery consultation requested for patient with epigastric abdominal pain radiating to the RUQ.  33 year old female with 1 day history of pain following a larger/ heavier meal last pm.  She is 1 month following a normal spontaneous vaginal delivery without complication.  PMH- hypertension only during pregnancy  PSH- none  Medications- none  Allergies- none  Family history- non contributory    Vitals non suggestive in ER.  Appears well, but mildly anxious/ uncomfortable.  Abdomen mildly distended, "relaxed" consistent with post gravid state.  No visible surgical scars.  Epigastrium to RUQ with tenderness to moderate palpation without rene peritoneal findings.    Labs with non suggestive CBC and chemistry with mild elevation of ALK Phos only.  Sono suggestive of acute cholecystitis, but no stones visualized.  Confirmatory CT with findings suggestive of acute cholecystitis with gallstones identified.    Overall, presention/ exam/ imaging all consistent with acute cholecystitis.    R/B/N of medical management versus definitive care with cholecystectomy discussed.  R/B/N of laparoscopy, laparoscopic cholecystectomy, possible open cholecystectomy, possible subtotal cholecystectomy reviewed preliminarily but in detail.  She is pleased, agrees, asks insightful questions and is prepared to proceed.    Will admit in preparation for surgery.    Please note that more than 70 minutes was spent in face to face time with greater than 50% in counseling and coordination of care.  Reviewed with patient, ER attending and today's RN team.  Full formal H & P to follow.

## 2023-07-09 NOTE — ED ADULT NURSE NOTE - OBJECTIVE STATEMENT
34 y/o female received aox4 ambulatory c/o RUQ abd pain x 1 days. denies n/v/d/sob/cp. pt is 4 weeks post partum.

## 2023-07-09 NOTE — ED PROVIDER NOTE - CHPI ED SYMPTOMS NEG
no diarrhea/no dysuria/no fever/no hematuria/no nausea/no vomiting/no chills no diarrhea/no fever/no hematuria/no nausea/no vomiting/no chills

## 2023-07-10 ENCOUNTER — RESULT REVIEW (OUTPATIENT)
Age: 33
End: 2023-07-10

## 2023-07-10 LAB
ALBUMIN SERPL ELPH-MCNC: 3.2 G/DL — LOW (ref 3.3–5)
ALP SERPL-CCNC: 161 U/L — HIGH (ref 30–120)
ALT FLD-CCNC: 34 U/L DA — SIGNIFICANT CHANGE UP (ref 10–60)
ANION GAP SERPL CALC-SCNC: 10 MMOL/L — SIGNIFICANT CHANGE UP (ref 5–17)
APTT BLD: 28 SEC — SIGNIFICANT CHANGE UP (ref 27.5–35.5)
AST SERPL-CCNC: 21 U/L — SIGNIFICANT CHANGE UP (ref 10–40)
BASOPHILS # BLD AUTO: 0.04 K/UL — SIGNIFICANT CHANGE UP (ref 0–0.2)
BASOPHILS NFR BLD AUTO: 0.6 % — SIGNIFICANT CHANGE UP (ref 0–2)
BILIRUB SERPL-MCNC: 0.2 MG/DL — SIGNIFICANT CHANGE UP (ref 0.2–1.2)
BLD GP AB SCN SERPL QL: SIGNIFICANT CHANGE UP
BUN SERPL-MCNC: 10 MG/DL — SIGNIFICANT CHANGE UP (ref 7–23)
CALCIUM SERPL-MCNC: 8.4 MG/DL — SIGNIFICANT CHANGE UP (ref 8.4–10.5)
CHLORIDE SERPL-SCNC: 108 MMOL/L — SIGNIFICANT CHANGE UP (ref 96–108)
CO2 SERPL-SCNC: 24 MMOL/L — SIGNIFICANT CHANGE UP (ref 22–31)
CREAT SERPL-MCNC: 1.02 MG/DL — SIGNIFICANT CHANGE UP (ref 0.5–1.3)
CULTURE RESULTS: SIGNIFICANT CHANGE UP
EGFR: 74 ML/MIN/1.73M2 — SIGNIFICANT CHANGE UP
EOSINOPHIL # BLD AUTO: 0.38 K/UL — SIGNIFICANT CHANGE UP (ref 0–0.5)
EOSINOPHIL NFR BLD AUTO: 5.4 % — SIGNIFICANT CHANGE UP (ref 0–6)
GLUCOSE SERPL-MCNC: 96 MG/DL — SIGNIFICANT CHANGE UP (ref 70–99)
HCT VFR BLD CALC: 39.8 % — SIGNIFICANT CHANGE UP (ref 34.5–45)
HGB BLD-MCNC: 13.1 G/DL — SIGNIFICANT CHANGE UP (ref 11.5–15.5)
IMM GRANULOCYTES NFR BLD AUTO: 0.4 % — SIGNIFICANT CHANGE UP (ref 0–0.9)
INR BLD: 1.07 RATIO — SIGNIFICANT CHANGE UP (ref 0.88–1.16)
LYMPHOCYTES # BLD AUTO: 1.53 K/UL — SIGNIFICANT CHANGE UP (ref 1–3.3)
LYMPHOCYTES # BLD AUTO: 21.5 % — SIGNIFICANT CHANGE UP (ref 13–44)
MCHC RBC-ENTMCNC: 28.4 PG — SIGNIFICANT CHANGE UP (ref 27–34)
MCHC RBC-ENTMCNC: 32.9 GM/DL — SIGNIFICANT CHANGE UP (ref 32–36)
MCV RBC AUTO: 86.1 FL — SIGNIFICANT CHANGE UP (ref 80–100)
MONOCYTES # BLD AUTO: 0.59 K/UL — SIGNIFICANT CHANGE UP (ref 0–0.9)
MONOCYTES NFR BLD AUTO: 8.3 % — SIGNIFICANT CHANGE UP (ref 2–14)
NEUTROPHILS # BLD AUTO: 4.53 K/UL — SIGNIFICANT CHANGE UP (ref 1.8–7.4)
NEUTROPHILS NFR BLD AUTO: 63.8 % — SIGNIFICANT CHANGE UP (ref 43–77)
NRBC # BLD: 0 /100 WBCS — SIGNIFICANT CHANGE UP (ref 0–0)
PLATELET # BLD AUTO: 276 K/UL — SIGNIFICANT CHANGE UP (ref 150–400)
POTASSIUM SERPL-MCNC: 4.1 MMOL/L — SIGNIFICANT CHANGE UP (ref 3.5–5.3)
POTASSIUM SERPL-SCNC: 4.1 MMOL/L — SIGNIFICANT CHANGE UP (ref 3.5–5.3)
PROT SERPL-MCNC: 7.1 G/DL — SIGNIFICANT CHANGE UP (ref 6–8.3)
PROTHROM AB SERPL-ACNC: 12.3 SEC — SIGNIFICANT CHANGE UP (ref 10.5–13.4)
RBC # BLD: 4.62 M/UL — SIGNIFICANT CHANGE UP (ref 3.8–5.2)
RBC # FLD: 13.1 % — SIGNIFICANT CHANGE UP (ref 10.3–14.5)
SODIUM SERPL-SCNC: 142 MMOL/L — SIGNIFICANT CHANGE UP (ref 135–145)
SPECIMEN SOURCE: SIGNIFICANT CHANGE UP
WBC # BLD: 7.1 K/UL — SIGNIFICANT CHANGE UP (ref 3.8–10.5)
WBC # FLD AUTO: 7.1 K/UL — SIGNIFICANT CHANGE UP (ref 3.8–10.5)

## 2023-07-10 PROCEDURE — 88304 TISSUE EXAM BY PATHOLOGIST: CPT | Mod: 26

## 2023-07-10 PROCEDURE — 47563 LAPARO CHOLECYSTECTOMY/GRAPH: CPT

## 2023-07-10 DEVICE — CLIP APPLIER COVIDIEN ENDOCLIP 10MM LARGE: Type: IMPLANTABLE DEVICE | Status: FUNCTIONAL

## 2023-07-10 RX ORDER — SENNA PLUS 8.6 MG/1
2 TABLET ORAL AT BEDTIME
Refills: 0 | Status: DISCONTINUED | OUTPATIENT
Start: 2023-07-10 | End: 2023-07-11

## 2023-07-10 RX ORDER — LANOLIN ALCOHOL/MO/W.PET/CERES
5 CREAM (GRAM) TOPICAL AT BEDTIME
Refills: 0 | Status: DISCONTINUED | OUTPATIENT
Start: 2023-07-10 | End: 2023-07-11

## 2023-07-10 RX ORDER — INDOCYANINE GREEN 25 MG
1.25 KIT INTRAVASCULAR; INTRAVENOUS ONCE
Refills: 0 | Status: COMPLETED | OUTPATIENT
Start: 2023-07-10 | End: 2023-07-10

## 2023-07-10 RX ORDER — ACETAMINOPHEN 500 MG
650 TABLET ORAL EVERY 6 HOURS
Refills: 0 | Status: DISCONTINUED | OUTPATIENT
Start: 2023-07-10 | End: 2023-07-11

## 2023-07-10 RX ORDER — SODIUM CHLORIDE 9 MG/ML
1000 INJECTION INTRAMUSCULAR; INTRAVENOUS; SUBCUTANEOUS
Refills: 0 | Status: DISCONTINUED | OUTPATIENT
Start: 2023-07-10 | End: 2023-07-11

## 2023-07-10 RX ORDER — HYDROMORPHONE HYDROCHLORIDE 2 MG/ML
0.5 INJECTION INTRAMUSCULAR; INTRAVENOUS; SUBCUTANEOUS EVERY 4 HOURS
Refills: 0 | Status: DISCONTINUED | OUTPATIENT
Start: 2023-07-10 | End: 2023-07-11

## 2023-07-10 RX ORDER — PANTOPRAZOLE SODIUM 20 MG/1
40 TABLET, DELAYED RELEASE ORAL DAILY
Refills: 0 | Status: DISCONTINUED | OUTPATIENT
Start: 2023-07-11 | End: 2023-07-11

## 2023-07-10 RX ORDER — OXYCODONE HYDROCHLORIDE 5 MG/1
5 TABLET ORAL EVERY 4 HOURS
Refills: 0 | Status: DISCONTINUED | OUTPATIENT
Start: 2023-07-10 | End: 2023-07-11

## 2023-07-10 RX ORDER — BENZOCAINE AND MENTHOL 5; 1 G/100ML; G/100ML
1 LIQUID ORAL ONCE
Refills: 0 | Status: COMPLETED | OUTPATIENT
Start: 2023-07-10 | End: 2023-07-10

## 2023-07-10 RX ORDER — HYDROMORPHONE HYDROCHLORIDE 2 MG/ML
1 INJECTION INTRAMUSCULAR; INTRAVENOUS; SUBCUTANEOUS
Refills: 0 | Status: DISCONTINUED | OUTPATIENT
Start: 2023-07-10 | End: 2023-07-10

## 2023-07-10 RX ORDER — ONDANSETRON 8 MG/1
4 TABLET, FILM COATED ORAL ONCE
Refills: 0 | Status: DISCONTINUED | OUTPATIENT
Start: 2023-07-10 | End: 2023-07-10

## 2023-07-10 RX ORDER — OXYCODONE HYDROCHLORIDE 5 MG/1
5 TABLET ORAL ONCE
Refills: 0 | Status: DISCONTINUED | OUTPATIENT
Start: 2023-07-10 | End: 2023-07-10

## 2023-07-10 RX ORDER — ENOXAPARIN SODIUM 100 MG/ML
40 INJECTION SUBCUTANEOUS EVERY 24 HOURS
Refills: 0 | Status: DISCONTINUED | OUTPATIENT
Start: 2023-07-11 | End: 2023-07-11

## 2023-07-10 RX ORDER — SODIUM CHLORIDE 9 MG/ML
1000 INJECTION, SOLUTION INTRAVENOUS
Refills: 0 | Status: DISCONTINUED | OUTPATIENT
Start: 2023-07-10 | End: 2023-07-10

## 2023-07-10 RX ORDER — HYDROMORPHONE HYDROCHLORIDE 2 MG/ML
0.5 INJECTION INTRAMUSCULAR; INTRAVENOUS; SUBCUTANEOUS
Refills: 0 | Status: DISCONTINUED | OUTPATIENT
Start: 2023-07-10 | End: 2023-07-10

## 2023-07-10 RX ADMIN — PIPERACILLIN AND TAZOBACTAM 25 GRAM(S): 4; .5 INJECTION, POWDER, LYOPHILIZED, FOR SOLUTION INTRAVENOUS at 23:59

## 2023-07-10 RX ADMIN — PIPERACILLIN AND TAZOBACTAM 25 GRAM(S): 4; .5 INJECTION, POWDER, LYOPHILIZED, FOR SOLUTION INTRAVENOUS at 00:10

## 2023-07-10 RX ADMIN — INDOCYANINE GREEN 1.25 MILLIGRAM(S): KIT INTRAVASCULAR; INTRAVENOUS at 13:35

## 2023-07-10 RX ADMIN — SENNA PLUS 2 TABLET(S): 8.6 TABLET ORAL at 21:40

## 2023-07-10 RX ADMIN — OXYCODONE HYDROCHLORIDE 5 MILLIGRAM(S): 5 TABLET ORAL at 21:40

## 2023-07-10 RX ADMIN — PANTOPRAZOLE SODIUM 40 MILLIGRAM(S): 20 TABLET, DELAYED RELEASE ORAL at 11:16

## 2023-07-10 RX ADMIN — HYDROMORPHONE HYDROCHLORIDE 0.5 MILLIGRAM(S): 2 INJECTION INTRAMUSCULAR; INTRAVENOUS; SUBCUTANEOUS at 17:00

## 2023-07-10 RX ADMIN — HYDROMORPHONE HYDROCHLORIDE 0.5 MILLIGRAM(S): 2 INJECTION INTRAMUSCULAR; INTRAVENOUS; SUBCUTANEOUS at 17:15

## 2023-07-10 RX ADMIN — PIPERACILLIN AND TAZOBACTAM 25 GRAM(S): 4; .5 INJECTION, POWDER, LYOPHILIZED, FOR SOLUTION INTRAVENOUS at 07:52

## 2023-07-10 RX ADMIN — PIPERACILLIN AND TAZOBACTAM 25 GRAM(S): 4; .5 INJECTION, POWDER, LYOPHILIZED, FOR SOLUTION INTRAVENOUS at 16:56

## 2023-07-10 RX ADMIN — OXYCODONE HYDROCHLORIDE 5 MILLIGRAM(S): 5 TABLET ORAL at 22:15

## 2023-07-10 RX ADMIN — SODIUM CHLORIDE 75 MILLILITER(S): 9 INJECTION, SOLUTION INTRAVENOUS at 16:56

## 2023-07-10 RX ADMIN — BENZOCAINE AND MENTHOL 1 LOZENGE: 5; 1 LIQUID ORAL at 23:59

## 2023-07-10 RX ADMIN — SODIUM CHLORIDE 100 MILLILITER(S): 9 INJECTION INTRAMUSCULAR; INTRAVENOUS; SUBCUTANEOUS at 12:12

## 2023-07-10 NOTE — H&P ADULT - NSHPREVIEWOFSYSTEMS_GEN_ALL_CORE
REVIEW OF SYSTEMS  Genera:  denies weight loss   Ophthalmologic:  no blurring of vision   Respiratory and Thorax:  no SOB 	  Cardiovascular:  no CP   Gastrointestinal:  abdominal pain   Genitourinary:  Hx of proteinuria

## 2023-07-10 NOTE — CARE COORDINATION ASSESSMENT. - TRANSPORTATION UTILIZED PRIOR TO ADMISSION
Pt. does not have a drivers Licence.  Pt. is independent no devices / works from home./family/friend provides transportation

## 2023-07-10 NOTE — CARE COORDINATION ASSESSMENT. - NSCAREPROVIDERS_GEN_ALL_CORE_FT
CARE PROVIDERS:  Accepting Physician: Daryn Barros  Admitting: Daryn Barros  Attending: Daryn Barros  Case Management: Jessica Meyer  Case Management: Santaromana, Anna  Consultant: Mabel Chaudhary  Consultant: Daryn Barros  ED Attending: Shalom Henderson  ED Nurse: Delon Vasquez  ED Nurse 2: Delon Vasquez  Nurse: Archana Ma  Nurse: Mercedes Hall  Outpatient Provider: Ale Feldman  Override: Shonda Garza  PCA/Nursing Assistant: Елена Fuentes  Primary Team: Brianda Lozano  Registered Dietitian: Hilda Craft  : Sanjuana Torres

## 2023-07-10 NOTE — H&P ADULT - NSHPLABSRESULTS_GEN_ALL_CORE
(07-10 @ 06:30):               13.1   7.10 )-----------(276                39.8   Neurophils% (auto):   63.8    manual%: x      Lymphocytes% (auto):  21.5    manual%: x      Eosinphils% (auto):   5.4     manual%: x      Bands%: x       blasts%: x        (07-09 @ 11:33):               13.0   7.76 )-----------(280                39.2   Neurophils% (auto):   63.8    manual%: x      Lymphocytes% (auto):  20.7    manual%: x      Eosinphils% (auto):   5.3     manual%: x      Bands%: x       blasts%: x        10 Jul 2023 06:30    142    |  108    |  10     ----------------------------<  96     4.1     |  24     |  1.02     Ca    8.4        10 Jul 2023 06:30    TPro  7.1    /  Alb  3.2    /  TBili  0.2    /  DBili  x      /  AST  21     /  ALT  34     /  AlkPhos  161    10 Jul 2023 06:30      PT/INR - ( 10 Jul 2023 06:30 )   PT: 12.3 sec;   INR: 1.07 ratio         PTT - ( 10 Jul 2023 06:30 )  PTT:28.0 sec    < from: CT Abdomen and Pelvis w/ IV Cont (07.09.23 @ 14:30) >      Findings concerning for acute cholecystitis. Correlate with LFTs.    Mild wall thickening and perivesicular stranding which maybe seen in the   setting of cystitis. Correlate with urinalysis.      < end of copied text >    < from: US Abdomen Complete (07.09.23 @ 10:10) >      Gallbladder wall thickening with gallbladder wall edema. No gallstones.   Clinical correlation is suggested. Liver prominent in size. Hepatic   steatosis.    < end of copied text >

## 2023-07-10 NOTE — H&P ADULT - HISTORY OF PRESENT ILLNESS
A 33 F with HTN, minimal change disease came in due to abdominal pain.  1 day PTA, patient complaint of  mild to moderate remittent epigastric pain/RUQ pain, crampy and burning in character non radiating with no known triggering or aggravating factor. Patient denies nausea/vomiting/fever.  Condition gradually worsened prompting consult to ED and this admission.    Denies chest pain/shortness of breath/urinary symptoms and change in bowel habits.     Patient  postpartum  1 month ago - diagnosed with eclampsia.

## 2023-07-10 NOTE — CARE COORDINATION ASSESSMENT. - NSDCPLANSERVICES_GEN_ALL_CORE
33y F for Miladis Larios today, NPO , On Zosyn.    ----    CM met w/ pt at the bedside/ introduced self and role of CM throughout the hospital stay with good understanding.  Pt.  states she lives with family and is independent PTA.  Pt. does not anticipate any home care needs or DME upon transition to home.  Pt. states she has not PCP , however she does plan on finding a PCP.  States she usually sees her GYN.  States she will follow up with surgeon as needed post acute.      Pharmacy is [address] [phone]  CM available if needed.  No skilled needs noted at this time/No Anticipated Discharge Needs

## 2023-07-10 NOTE — H&P ADULT - NSHPPHYSICALEXAM_GEN_ALL_CORE
ICU Vital Signs Last 24 Hrs  T(C): 36.7 (10 Jul 2023 07:30), Max: 36.8 (09 Jul 2023 23:31)  T(F): 98.1 (10 Jul 2023 07:30), Max: 98.2 (09 Jul 2023 23:31)  HR: 85 (10 Jul 2023 07:30) (71 - 85)  BP: 115/80 (10 Jul 2023 07:30) (112/68 - 136/87)  BP(mean): --  ABP: --  ABP(mean): --  RR: 16 (10 Jul 2023 07:30) (15 - 16)  SpO2: 98% (10 Jul 2023 07:30) (97% - 100%)    O2 Parameters below as of 10 Jul 2023 07:30  Patient On (Oxygen Delivery Method): room air         Gen:  NAD   HEENT:  pink conjunctivae, anicteric sclerae, PERRLA, EOMI   c/l:  cta, b/l   cvs:  s1s2   abd:  non distended, +BS, soft, +tenderness RUQ, +murphys  ext::  calf soft, non tender

## 2023-07-10 NOTE — CARE COORDINATION ASSESSMENT. - SOURCES OF SUPPORT FOR PATIENT
Pt. states she is independent PTA.  Spouse or sister help her with transportation as needed or she uses [employer] for shopping.  She plans to get drivers Licence now that she lives in [place].  Mother in law coming from [place] to assist with baby as needed.  Declined offer to contact spouse./spouse/parent(s)/sibling(s)

## 2023-07-10 NOTE — BRIEF OPERATIVE NOTE - NSICDXBRIEFPROCEDURE_GEN_ALL_CORE_FT
PROCEDURES:  Laparoscopic cholecystectomy, with cholangiography 10-Jul-2023 17:49:07  Daryn Barros

## 2023-07-10 NOTE — CARE COORDINATION ASSESSMENT. - NSPASTMEDSURGHISTORY_GEN_ALL_CORE_FT
PAST MEDICAL & SURGICAL HISTORY:  No significant past surgical history      Hypertension      Minimal change disease

## 2023-07-10 NOTE — H&P ADULT - ASSESSMENT
A 33 F with hx of HTN, minimal change disease c/o abdominal pain due to acute cholecystitis     - NPO   - Pain management   - for OR lap ginny possible open    - discuss to patient procedure, answers question and address concern   -preop labs   -post op care   will discuss with surgical team  A 33 F with hx of HTN, minimal change disease c/o abdominal pain due to acute cholecystitis   - NPO   - Pain management   - for OR lap ginny possible open    - discuss to patient procedure, answers question and address concern   -preop labs   -post op care   will discuss with surgical team    Ms. Woods was seen preoperatively in the holding area.      She reports continued discomfort despite limited PO intake and multiple doses of ABX.    The imaging reports from her Sono and CT were reviewed confirming a diagnosis of gallstones with cholecystitis.    The risks, benefits and nature of the operation were discussed at length.      These points included, but were not limited to the possibility of conversion to open surgery, the possibility of intra-operative imaging or the performance of a cholangiogram, the possibility of post-operative intra-abdominal drains, the possibility of biliary tract, visceral or vascular injury, the possibility of residual abdominal wall deformity, the possibility of infection or  abscess or post-operative collection, and the approximate size, number and location of the typical or expected operative incisions.  We have discussed that subtotal or partial cholecystectomy, cholecystostomy with drainage or a discontinued diagnostic laparoscopy is also possible pending the intra-operative findings.      We have discussed that all abdominal surgery maintains the possibilities of future abdominal wall herniation, future intra-abdominal adhesions with the risk of bowel obstruction and increased risk during future operative interventions.      We have detailed that medical complications unrelated to the specific operative procedure such as cardiopulmonary issues, deep venous thrombosis with or without pulmonary embolism and urinary retention are possible.      We have specifically discussed the possibility of Post Cholecystectomy Syndrome with residual abdominal pain or post-prandial gastrointestinal complaints.  We have discussed that this is a process which may be amenable to conservative management or may ultimately require further future gastroenterology evaluation.  We have discussed that Post Cholecystectomy Syndrome complaints may require long term dietary modifications with or without supplemental prescription medications.      We have reviewed that general anesthesia with intubation will be required and that pending post-operative progress, discharge may be appropriate or further inpatient care may be required.      Ms. Krueger demonstrates good overall understanding and remains eager to proceed.  Intravenous dosings of antibiotic were provided prior to the initiation of the procedure.    I have discussed this case with the Anesthesia team and the Chief of Gynecology.  Given the presence of clinical and radiographically confirmed acute cholecystitis, this intervention is not to be considered an elective surgery.  Further delay places her at risk of deterioration and a more difficult/ involved further future surgical intervention to provide her with definitive care by cholecystectomy (which would be the standard of care given her young age and necessary ongoing lifestyle limitations.)

## 2023-07-11 ENCOUNTER — TRANSCRIPTION ENCOUNTER (OUTPATIENT)
Age: 33
End: 2023-07-11

## 2023-07-11 VITALS
RESPIRATION RATE: 18 BRPM | TEMPERATURE: 98 F | OXYGEN SATURATION: 98 % | HEART RATE: 67 BPM | SYSTOLIC BLOOD PRESSURE: 127 MMHG | DIASTOLIC BLOOD PRESSURE: 82 MMHG

## 2023-07-11 PROCEDURE — 88304 TISSUE EXAM BY PATHOLOGIST: CPT

## 2023-07-11 PROCEDURE — 36415 COLL VENOUS BLD VENIPUNCTURE: CPT

## 2023-07-11 PROCEDURE — 83690 ASSAY OF LIPASE: CPT

## 2023-07-11 PROCEDURE — 74177 CT ABD & PELVIS W/CONTRAST: CPT | Mod: MG

## 2023-07-11 PROCEDURE — G1004: CPT

## 2023-07-11 PROCEDURE — 87086 URINE CULTURE/COLONY COUNT: CPT

## 2023-07-11 PROCEDURE — 86901 BLOOD TYPING SEROLOGIC RH(D): CPT

## 2023-07-11 PROCEDURE — 96375 TX/PRO/DX INJ NEW DRUG ADDON: CPT

## 2023-07-11 PROCEDURE — 76700 US EXAM ABDOM COMPLETE: CPT

## 2023-07-11 PROCEDURE — 99285 EMERGENCY DEPT VISIT HI MDM: CPT | Mod: 25

## 2023-07-11 PROCEDURE — 85025 COMPLETE CBC W/AUTO DIFF WBC: CPT

## 2023-07-11 PROCEDURE — 86850 RBC ANTIBODY SCREEN: CPT

## 2023-07-11 PROCEDURE — 96361 HYDRATE IV INFUSION ADD-ON: CPT

## 2023-07-11 PROCEDURE — 84702 CHORIONIC GONADOTROPIN TEST: CPT

## 2023-07-11 PROCEDURE — 86900 BLOOD TYPING SEROLOGIC ABO: CPT

## 2023-07-11 PROCEDURE — 85730 THROMBOPLASTIN TIME PARTIAL: CPT

## 2023-07-11 PROCEDURE — 80053 COMPREHEN METABOLIC PANEL: CPT

## 2023-07-11 PROCEDURE — 96374 THER/PROPH/DIAG INJ IV PUSH: CPT

## 2023-07-11 PROCEDURE — C1889: CPT

## 2023-07-11 PROCEDURE — 81001 URINALYSIS AUTO W/SCOPE: CPT

## 2023-07-11 PROCEDURE — 85610 PROTHROMBIN TIME: CPT

## 2023-07-11 RX ORDER — OXYCODONE HYDROCHLORIDE 5 MG/1
1 TABLET ORAL
Qty: 8 | Refills: 0
Start: 2023-07-11

## 2023-07-11 RX ORDER — BENZOCAINE AND MENTHOL 5; 1 G/100ML; G/100ML
1 LIQUID ORAL ONCE
Refills: 0 | Status: COMPLETED | OUTPATIENT
Start: 2023-07-11 | End: 2023-07-11

## 2023-07-11 RX ADMIN — PIPERACILLIN AND TAZOBACTAM 25 GRAM(S): 4; .5 INJECTION, POWDER, LYOPHILIZED, FOR SOLUTION INTRAVENOUS at 08:46

## 2023-07-11 RX ADMIN — BENZOCAINE AND MENTHOL 1 LOZENGE: 5; 1 LIQUID ORAL at 09:45

## 2023-07-11 NOTE — PROGRESS NOTE ADULT - SUBJECTIVE AND OBJECTIVE BOX
SURGERY Progress Note PA    Pt is a 33 F with HTN, minimal change disease came in due to abdominal pain.  1 day PTA, patient complaint of  mild to moderate remittent epigastric pain/RUQ pain, crampy and burning in character non radiating with no known triggering or aggravating factor.  Patient  postpartum  1 month ago - diagnosed with eclampsia.    US cholecystitis -> Lap Cholecystectomy POD #1    SUBJECTIVE:   Patient seen at bedside, no overnight events, no complaints noted and pain is controlled at this time.   Patient admits to flatus, no bowel movement this am.   Patient denies any headaches, chest pain, shortness of breath, nausea, vomiting, fever, chills, weakness, dysuria  Patient A+Ox3 in NAD at time of visit ad states ready to go home.    OBJECTIVE:   T(C): 36.9 (23 @ 07:44), Max: 36.9 (23 @ 07:44)  HR: 67 (23 @ 07:44) (67 - 115)  BP: 127/82 (23 @ 07:44) (100/65 - 136/62)  RR: 18 (23 @ 07:44) (14 - 18)  SpO2: 98% (23 @ 07:44) (95% - 100%)  CAPILLARY BLOOD GLUCOSE    I&O's Detail    10 Jul 2023 07:01  -  2023 07:00  --------------------------------------------------------  IN:    Lactated Ringers: 1275 mL    Oral Fluid: 480 mL    sodium chloride 0.9%: 700 mL  Total IN: 2455 mL    OUT:    Blood Loss (mL): 10 mL    Voided (mL): 400 mL  Total OUT: 410 mL    Total NET: 2045 mL    Physical exam:  General: A+O x 3 in NAD  HEENT: PERRLA, EOM intact  Neck: trachea midline  Chest: Clear through auscultation bilaterally, No rales, rhonchi wheezes noted bilaterally  Heart: S1,S1 RRR, no murmurs noted  Abdomen: soft non distended, non tender, non tympanic, BS x 4, no guarding noted  Incisions: intact, no erythema noted  Extremities: no edema noted, warm,  no calf tenderness     MEDICATIONS  (STANDING):  benzocaine/menthol Lozenge 1 Lozenge Oral once  enoxaparin Injectable 40 milliGRAM(s) SubCutaneous every 24 hours  pantoprazole  Injectable 40 milliGRAM(s) IV Push daily  piperacillin/tazobactam IVPB.. 3.375 Gram(s) IV Intermittent every 8 hours  senna 2 Tablet(s) Oral at bedtime  sodium chloride 0.9%. 1000 milliLiter(s) (100 mL/Hr) IV Continuous <Continuous>  sodium chloride 0.9%. 1000 milliLiter(s) (75 mL/Hr) IV Continuous <Continuous>    MEDICATIONS  (PRN):  acetaminophen     Tablet .. 650 milliGRAM(s) Oral every 6 hours PRN Temp greater or equal to 38C (100.4F), Mild Pain (1 - 3)  HYDROmorphone  Injectable 0.5 milliGRAM(s) IV Push every 4 hours PRN Severe Pain (7 - 10)  melatonin 5 milliGRAM(s) Oral at bedtime PRN Sleep  oxyCODONE    IR 5 milliGRAM(s) Oral every 4 hours PRN Moderate Pain (4 - 6)      LABS:                        13.1   7.10  )-----------( 276      ( 10 Jul 2023 06:30 )             39.8     07-10    142  |  108  |  10  ----------------------------<  96  4.1   |  24  |  1.02    Ca    8.4      10 Jul 2023 06:30    TPro  7.1  /  Alb  3.2<L>  /  TBili  0.2  /  DBili  x   /  AST  21  /  ALT  34  /  AlkPhos  161<H>  07-10    PT/INR - ( 10 Jul 2023 06:30 )   PT: 12.3 sec;   INR: 1.07 ratio         PTT - ( 10 Jul 2023 06:30 )  PTT:28.0 sec  Urinalysis Basic - ( 10 Jul 2023 06:30 )    Color: x / Appearance: x / SG: x / pH: x  Gluc: 96 mg/dL / Ketone: x  / Bili: x / Urobili: x   Blood: x / Protein: x / Nitrite: x   Leuk Esterase: x / RBC: x / WBC x   Sq Epi: x / Non Sq Epi: x / Bacteria: x    Assessment/Plan  Patient is a 33y old female who presents with a chief complaint of abdominal pain -> Cholecystitis-> Lap Cholecystectomy  Continue current care  Diet - advance to low fat  GI/DVT prophylaxis  Analgesia prn  OOB/ambulation on the floor  Incentive spirometry/Cough/Deep breathing exercises  Sequentials  d/c to home  Reviewed with Dr. Barros

## 2023-07-11 NOTE — DISCHARGE NOTE PROVIDER - CARE PROVIDER_API CALL
Daryn Barros  Surgery  [address] [place] [address]  Phone: [phone]  Fax: [phone]  Follow Up Time:

## 2023-07-11 NOTE — DISCHARGE NOTE NURSING/CASE MANAGEMENT/SOCIAL WORK - PATIENT PORTAL LINK FT
You can access the FollowMyHealth Patient Portal offered by [facility] by registering at the following website: [email or web address]. By joining [employer]’s FollowMyHealth portal, you will also be able to view your health information using other applications (apps) compatible with our system.

## 2023-07-11 NOTE — DISCHARGE NOTE PROVIDER - NSDCMRMEDTOKEN_GEN_ALL_CORE_FT
Prenatabs Rx oral tablet: 1 orally once a day   oxyCODONE 5 mg oral tablet: 1 tab(s) orally every 6 hours as needed for  severe pain. For the management of severe pain you may have after your procedure MDD: 4  Prenatabs Rx oral tablet: 1 orally once a day

## 2023-07-11 NOTE — DISCHARGE NOTE PROVIDER - HOSPITAL COURSE
The patient is a 32 y/o f who on  presented with HTN, minimal change disease came in due to abdominal pain.  Had 1 day PTA, patient complaint of mild to moderate remittent epigastric pain/RUQ pain, crampy and burning in character non radiating with no known triggering or aggravating factor. Patient denies nausea/vomiting/fever.  Condition gradually worsened prompting consult to ED and this admission.  Patient  postpartum  1 month ago - diagnosed with eclampsia.  Not breast feeding.    US IMPRESSION:  Gallbladder wall thickening with gallbladder wall edema. No gallstones.   Clinical correlation is suggested. Liver prominent in size. Hepatic   steatosis.    After admission and receiving preoperative parenteral prophylactic antibiotics, the patient underwent an uneventful and uncomplicated Laparoscopic Cholecystectomy by Dr WILLIAMS Barros.   After completion of surgery and emergence from anesthesia, the patient was transferred to the PACU for routine hemodynamic, airway, and pain monitoring.  After a stable PACU course, the patient was transferred to the surgical unit for acute post-operative care. The patient had a stable and uncomplicated hospital course with no medical complications.      A course of post-op parenteral antibiotics was given to complete surgical prophylaxis.  Pre-operative medications were continued during the post-op course, and adjusted if medically needed.  Medical consultation from the Hospitalist service was available for post-operative medical co-management if needed.    Lab work ordering/results was followed by the Medical/Surgical team.  The patient had a clean appearing surgical incisions with no sign of surgical site infections.  Discharge instructions were delineated in the Discharge Plan and reviewed with the patient.  All medications were listed in the medication reconciliation document, and valente points were reviewed with the patient.  The patient was deemed stable for discharge today 23.  Upon discharge, the patient will following up with Dr WILLIAMS Barros whose office number may be found on the discharge instructions.

## 2023-07-11 NOTE — DISCHARGE NOTE PROVIDER - NSDCCPTREATMENT_GEN_ALL_CORE_FT
PRINCIPAL PROCEDURE  Procedure: Laparoscopic cholecystectomy, with cholangiography  Findings and Treatment:

## 2023-07-11 NOTE — DISCHARGE NOTE PROVIDER - NSDCFUADDINST_GEN_ALL_CORE_FT
Low Fat diet  Follow all verbal and written instructions. Take medications as prescribed. DO NOT drive, operate machinery, and/or make important decisions while on prescription pain medication. DO NOT hesitate to call Doctor's office with questions or concerns. Certain prescription pain medication can cause constipation; take a stool softener such as Colace 100mg 3 x a day, to avoid the constipating effects of prescription pain medication.  For SEVERE pain:  Oxycodone for severe pain as prescribed.   prescription from your pharmacy  For MODERATE pain:  Tylenol 325mg 2 tabs (650mg total) every 6 hours.    For the next 24-48 hours while awake, alternate ice pack 15 minutes on & 15 minutes off    Call Dr. Meneses's office at [phone] to make an appointment to be seen within 7- 10 days

## 2023-07-11 NOTE — PROGRESS NOTE ADULT - ASSESSMENT
As in above full PA notation.  Ms. Krueger personally seen/ examined during am rounds prior to discharge.  No acute events overnight.  Reports good pain control.  Tolerating advancement of diet.  Denies GI complaints.  Vitals non-suggestive.  Wounds clean and abdomen soft with appropriate incisional tenderness and dry dressings.  Lab draw refused by patient this am... "I knew I was so good that I was going home... I didn't feel that I needed it.  Clinically, improving overall.  Surgically, stable for discharge.  All instructions reviewed personally in detail.  Follow-up around two weeks.  Encouraged to call for any questions or concerns.  Reviewed with patient, Surgical PA as well as today's RN.

## 2023-08-08 ENCOUNTER — APPOINTMENT (OUTPATIENT)
Dept: SURGERY | Facility: CLINIC | Age: 33
End: 2023-08-08
Payer: COMMERCIAL

## 2023-08-08 ENCOUNTER — NON-APPOINTMENT (OUTPATIENT)
Age: 33
End: 2023-08-08

## 2023-08-08 VITALS
DIASTOLIC BLOOD PRESSURE: 90 MMHG | OXYGEN SATURATION: 98 % | BODY MASS INDEX: 31.72 KG/M2 | HEART RATE: 70 BPM | WEIGHT: 168 LBS | HEIGHT: 61 IN | SYSTOLIC BLOOD PRESSURE: 130 MMHG | TEMPERATURE: 96.5 F

## 2023-08-08 DIAGNOSIS — Z90.49 ACQUIRED ABSENCE OF OTHER SPECIFIED PARTS OF DIGESTIVE TRACT: ICD-10-CM

## 2023-08-08 DIAGNOSIS — Z87.19 PERSONAL HISTORY OF OTHER DISEASES OF THE DIGESTIVE SYSTEM: ICD-10-CM

## 2023-08-08 PROCEDURE — 99024 POSTOP FOLLOW-UP VISIT: CPT

## 2023-08-08 RX ORDER — FERROUS SULFATE TAB EC 325 MG (65 MG FE EQUIVALENT) 325 (65 FE) MG
325 (65 FE) TABLET DELAYED RESPONSE ORAL DAILY
Refills: 0 | Status: DISCONTINUED | COMMUNITY
Start: 2023-06-11 | End: 2023-08-08

## 2023-08-08 RX ORDER — MULTIVIT-MIN/FOLIC/VIT K/LYCOP 400-300MCG
500 TABLET ORAL DAILY
Refills: 0 | Status: DISCONTINUED | COMMUNITY
Start: 2023-06-11 | End: 2023-08-08

## 2023-08-08 NOTE — HISTORY OF PRESENT ILLNESS
[de-identified] : Very pleasant patient returning to office for routine post operative care. Ms. Krueger is now s/p laparoscopic cholecystectomy for clinical/ radiographic diagnosis of acute cholecystitis. She is pleased with her progress and believes that she has done well. She admits to minimal reflux when skipping meals, but denies RUQ pain or any lower GI complaints...

## 2023-08-08 NOTE — DATA REVIEWED
[FreeTextEntry1] : Archie Accession Number : [id number] Patient:   NEYMAR ROQUE Accession:                             [id number] Collected Date/Time:                   7/10/2023 16:05 EDT Received Date/Time:                    7/11/2023 09:27 EDT Surgical Pathology Report - Auth (Verified) Specimen(s) Submitted Gallbladder/contents Final Diagnosis Gallbladder/contents, laparoscopic cholecystectomy: - Chronic cholecystitis with cholelithiasis and cholesterolosis. Verified by: Irma Oh MD (Electronic Signature) Reported on: 07/12/23 13:04 EDT, [address] [place] [address], Phone: [phone]   Fax: [phone] _________________________________________________________________   Procedure:  Laparoscopic cholecystectomy with ICG cholangiogram   Perioperative Diagnosis Acute cholecystitis  Postoperative Diagnosis Same  Gross Description Received:  In formalin labeled  "gallbladder and contents" Integrity:  Intact, measuring 10.0 x 4.0 x 3.5 cm Cystic Duct:  Patent, marked by silver metallic staple and inked black Size: 0.2 cm in length and 0.3 cm in diameter Lymph Node:  Not identified Serosa:  Smooth dusky purple  Hepatic Surface:  Cauterized and shaggy Bile:  Green viscous, present in the lumen Calculi:  A single calculus, 1.3 x 1.0 x 1.0 cm  , hard green ovoid, present in the lumen, Mucosa:  Green velvety with scant raised yellow streaking Wall:  Ranging from 0.1 cm to 0.2 cm thick Submitted:  Representative sections in 2 cassettes: 1A-1B: Shaved cystic duct margin and full thickness sections to include neck, body and fundus  Kathi WALKER 07/11/2023 11:29 AM  Disclaimer In addition to other data that may appear on the specimen containers, all labels have been inspected to confirm the presence of the patient's name and date of birth. Gross examination, histological processing and microscopic evaluation performed at [facility], Department of Pathology, [address] [place] [address].

## 2023-08-08 NOTE — PHYSICAL EXAM
[Respiratory Effort] : normal respiratory effort [Normal Rate and Rhythm] : normal rate and rhythm [No HSM] : no hepatosplenomegaly [Tender] : was nontender [Enlarged] : not enlarged [No Rash or Lesion] : No rash or lesion [Alert] : alert [Oriented to Person] : oriented to person [Oriented to Place] : oriented to place [Oriented to Time] : oriented to time [Calm] : calm [de-identified] : Appears well, no acute distress, ambulates easily into office and assumes examination table without need of assistance. [de-identified] : Normocephalic and atraumatic.  [de-identified] : Supple with full range of motion.  [FreeTextEntry1] : No cervical, supraclavicular, axillary or inguinal adenopathy.  [de-identified] : Deferred.  [de-identified] : RUQ without visible fullness. Epigastrium without palpable fullness. Entire abdomen is non tender. Operative incisions healing well and clean, dry, intact without SQ collection or fascial defect. [de-identified] : Deferred.  [de-identified] : Deferred.  [de-identified] : Grossly symmetric and within normal limits without motor or sensory deficits.

## 2023-08-08 NOTE — PLAN
[FreeTextEntry1] : S/P uneventful general anesthesia with uncomplicated laparoscopic cholecystectomy. Ms. ROQUE is clinically well by this history and surgically stable by this examination. There is no evidence by history or examination to suggest complication. Ms. ROQUE  is cleared to resume casual activities with own comfort as guide. To refrain from maximal exertions for another month. The timing and technique of scar massage reviewed in detail with Ms. ROQUE . The Pathology report and it's benign nature was reviewed in detail. Ms. ROQUE  has been encouraged to call with questions or concerns.   Otherwise, RTO PRN. Ms. ROQUE is pleased and agrees, leaving in good spirits able to verbalize instructions as outlined above.  We have agreed that if her GERD progresses at any time or if it does not shane in the next 4 to 6 weeks, she is welcome to follow-up by phone to initiate a PPI and coordinate GI evaluations.

## 2024-01-24 NOTE — OB PROVIDER IHI INDUCTION/AUGMENTATION NOTE - LABOR: CERVICAL CONSISTENCY
Date of Service 1/24/2024    BHAVNA OTTO  Date of Birth 11/14/1975    Patient Age: 48 year old    PCP: Tomy Mohan MD  [address]  [facility] [address]    Heart Scan Consult  Preliminary Heart Scan Score: 0    Previous Screening  Heart Scan Completed Previously: Yes  Year of last heart scan: 2017  Score of last heart scan: 0  Peripheral Vascular Scan Completed Previously: No          Risk Factors  Personal Risk Factors  Non-alterable Risk Factors: Family History (Father recently had cardiac bypass surgery.)  Alterable Risk Factors: Abnormal Cholesterol;Lack of exercise      Body Mass Index  There is no height or weight on file to calculate BMI.    Blood Pressure  Blood Pressure measurement declined during this encounter.  (Normal =< 120/80,  Elevated = 120-129/ >80,  High Stage1 130-139/80-89 , Stage2 >140/>90)    Lipid Profile  Lipid panel in 2022  Total Cholesterol 157  HDL 33  LDL 97  Triglycerides 153    Cholesterol Goals  Value   Total  =< 200   HDL  = > 45 Men = > 55 Women   LDL   =< 100   Triglycerides  =< 150       Glucose and Hemoglobin A1C  Lab Results   Component Value Date    GLU 97 04/12/2023    A1C 5.5 04/12/2023     (Normal Fasting Glucose < 100mg/dl )    Nurse Review  Risk factor information and results reviewed with Nurse: Yes    Recommended Follow Up:  Consult your physician regarding:: Final Heart Scan Report;Discuss potential for Incidental Finding      Recommendations for Change:  Nutrition Changes: Low Saturated Fat;Low Fat Dairy;Increase Fiber (Encouraged to consume less animal products and choose leaner meats like chicken without skin.)    Cholesterol Modification (goal of therapy depends upon your risk): Increase HDL (Healthy/Good) Normal >45 Men >55 Women;Decrease LDL (Lousy/Bad) Ideal <100;Decrease Triglycerides (Ugly) Normal <150    Exercise: Initiate Program (Discussed resuming walking on treadmill and strength exercises.)         Weight Management: Maintain Current  Weight         Repeat Heart Scan: 5 years if Calcium Score is 0.0;Discuss with your Physician              [place] Recommended Resources:               Jeanie METCALF RN        Please Contact the Nurse Heart Line with any Questions or Concerns [phone].   soft

## 2024-02-10 ENCOUNTER — ASOB RESULT (OUTPATIENT)
Age: 34
End: 2024-02-10

## 2024-02-10 ENCOUNTER — LABORATORY RESULT (OUTPATIENT)
Age: 34
End: 2024-02-10

## 2024-02-10 ENCOUNTER — APPOINTMENT (OUTPATIENT)
Dept: ANTEPARTUM | Facility: CLINIC | Age: 34
End: 2024-02-10
Payer: COMMERCIAL

## 2024-02-10 PROCEDURE — 76801 OB US < 14 WKS SINGLE FETUS: CPT

## 2024-02-10 PROCEDURE — 76813 OB US NUCHAL MEAS 1 GEST: CPT

## 2024-02-10 PROCEDURE — 36416 COLLJ CAPILLARY BLOOD SPEC: CPT

## 2024-04-02 ENCOUNTER — APPOINTMENT (OUTPATIENT)
Dept: ANTEPARTUM | Facility: CLINIC | Age: 34
End: 2024-04-02
Payer: COMMERCIAL

## 2024-04-02 ENCOUNTER — ASOB RESULT (OUTPATIENT)
Age: 34
End: 2024-04-02

## 2024-04-02 PROCEDURE — 76811 OB US DETAILED SNGL FETUS: CPT

## 2024-05-16 ENCOUNTER — APPOINTMENT (OUTPATIENT)
Dept: MATERNAL FETAL MEDICINE | Facility: CLINIC | Age: 34
End: 2024-05-16
Payer: COMMERCIAL

## 2024-05-16 ENCOUNTER — ASOB RESULT (OUTPATIENT)
Age: 34
End: 2024-05-16

## 2024-05-16 DIAGNOSIS — O24.119 PRE-EXISTING TYPE 2 DIABETES MELLITUS, TYPE 2, IN PREGNANCY, UNSPECIFIED TRIMESTER: ICD-10-CM

## 2024-05-16 PROCEDURE — G0108 DIAB MANAGE TRN  PER INDIV: CPT | Mod: 95

## 2024-05-29 ENCOUNTER — ASOB RESULT (OUTPATIENT)
Age: 34
End: 2024-05-29

## 2024-05-29 ENCOUNTER — APPOINTMENT (OUTPATIENT)
Dept: MATERNAL FETAL MEDICINE | Facility: CLINIC | Age: 34
End: 2024-05-29
Payer: COMMERCIAL

## 2024-05-29 PROCEDURE — 99205 OFFICE O/P NEW HI 60 MIN: CPT | Mod: 95

## 2024-05-30 ENCOUNTER — ASOB RESULT (OUTPATIENT)
Age: 34
End: 2024-05-30

## 2024-05-30 ENCOUNTER — APPOINTMENT (OUTPATIENT)
Dept: MATERNAL FETAL MEDICINE | Facility: CLINIC | Age: 34
End: 2024-05-30
Payer: COMMERCIAL

## 2024-05-30 PROCEDURE — G0108 DIAB MANAGE TRN  PER INDIV: CPT | Mod: 95

## 2024-06-07 ENCOUNTER — ASOB RESULT (OUTPATIENT)
Age: 34
End: 2024-06-07

## 2024-06-07 ENCOUNTER — APPOINTMENT (OUTPATIENT)
Dept: ANTEPARTUM | Facility: CLINIC | Age: 34
End: 2024-06-07
Payer: COMMERCIAL

## 2024-06-07 PROCEDURE — 76819 FETAL BIOPHYS PROFIL W/O NST: CPT | Mod: 59

## 2024-06-07 PROCEDURE — 76816 OB US FOLLOW-UP PER FETUS: CPT

## 2024-06-12 ENCOUNTER — RX RENEWAL (OUTPATIENT)
Age: 34
End: 2024-06-12

## 2024-06-13 ENCOUNTER — APPOINTMENT (OUTPATIENT)
Dept: MATERNAL FETAL MEDICINE | Facility: CLINIC | Age: 34
End: 2024-06-13
Payer: COMMERCIAL

## 2024-06-13 ENCOUNTER — ASOB RESULT (OUTPATIENT)
Age: 34
End: 2024-06-13

## 2024-06-13 PROCEDURE — G0108 DIAB MANAGE TRN  PER INDIV: CPT | Mod: 95

## 2024-06-19 ENCOUNTER — APPOINTMENT (OUTPATIENT)
Dept: PEDIATRIC CARDIOLOGY | Facility: CLINIC | Age: 34
End: 2024-06-19
Payer: COMMERCIAL

## 2024-06-19 PROCEDURE — 76821 MIDDLE CEREBRAL ARTERY ECHO: CPT

## 2024-06-19 PROCEDURE — 76820 UMBILICAL ARTERY ECHO: CPT

## 2024-06-19 PROCEDURE — 76825 ECHO EXAM OF FETAL HEART: CPT

## 2024-06-19 PROCEDURE — 99202 OFFICE O/P NEW SF 15 MIN: CPT | Mod: 25

## 2024-06-19 PROCEDURE — 76827 ECHO EXAM OF FETAL HEART: CPT

## 2024-06-19 PROCEDURE — 93325 DOPPLER ECHO COLOR FLOW MAPG: CPT | Mod: 59

## 2024-06-20 RX ORDER — BLOOD-GLUCOSE SENSOR
EACH MISCELLANEOUS
Qty: 3 | Refills: 2 | Status: ACTIVE | COMMUNITY
Start: 2024-05-16 | End: 1900-01-01

## 2024-06-21 ENCOUNTER — APPOINTMENT (OUTPATIENT)
Dept: MATERNAL FETAL MEDICINE | Facility: CLINIC | Age: 34
End: 2024-06-21

## 2024-07-27 ENCOUNTER — APPOINTMENT (OUTPATIENT)
Dept: ANTEPARTUM | Facility: CLINIC | Age: 34
End: 2024-07-27
Payer: COMMERCIAL

## 2024-07-27 PROCEDURE — 76819 FETAL BIOPHYS PROFIL W/O NST: CPT | Mod: 26

## 2024-07-27 PROCEDURE — 76815 OB US LIMITED FETUS(S): CPT | Mod: 26

## 2024-07-28 ENCOUNTER — INPATIENT (INPATIENT)
Facility: HOSPITAL | Age: 34
LOS: 2 days | Discharge: HOME | End: 2024-07-31
Attending: STUDENT IN AN ORGANIZED HEALTH CARE EDUCATION/TRAINING PROGRAM | Admitting: STUDENT IN AN ORGANIZED HEALTH CARE EDUCATION/TRAINING PROGRAM
Payer: COMMERCIAL

## 2024-07-28 ENCOUNTER — TRANSCRIPTION ENCOUNTER (OUTPATIENT)
Age: 34
End: 2024-07-28

## 2024-07-28 VITALS
HEART RATE: 88 BPM | SYSTOLIC BLOOD PRESSURE: 126 MMHG | RESPIRATION RATE: 18 BRPM | DIASTOLIC BLOOD PRESSURE: 79 MMHG | TEMPERATURE: 98 F

## 2024-07-28 DIAGNOSIS — O14.13 SEVERE PRE-ECLAMPSIA, THIRD TRIMESTER: ICD-10-CM

## 2024-07-28 DIAGNOSIS — Z90.49 ACQUIRED ABSENCE OF OTHER SPECIFIED PARTS OF DIGESTIVE TRACT: Chronic | ICD-10-CM

## 2024-07-28 LAB
ALBUMIN SERPL ELPH-MCNC: 3.6 G/DL — SIGNIFICANT CHANGE UP (ref 3.3–5)
ALP SERPL-CCNC: 170 U/L — HIGH (ref 40–120)
ALT FLD-CCNC: 58 U/L — HIGH (ref 4–33)
ANION GAP SERPL CALC-SCNC: 13 MMOL/L — SIGNIFICANT CHANGE UP (ref 7–14)
APPEARANCE UR: CLEAR — SIGNIFICANT CHANGE UP
AST SERPL-CCNC: 32 U/L — SIGNIFICANT CHANGE UP (ref 4–32)
BASOPHILS # BLD AUTO: 0.02 K/UL — SIGNIFICANT CHANGE UP (ref 0–0.2)
BASOPHILS # BLD AUTO: 0.02 K/UL — SIGNIFICANT CHANGE UP (ref 0–0.2)
BASOPHILS NFR BLD AUTO: 0.3 % — SIGNIFICANT CHANGE UP (ref 0–2)
BASOPHILS NFR BLD AUTO: 0.4 % — SIGNIFICANT CHANGE UP (ref 0–2)
BILIRUB SERPL-MCNC: <0.2 MG/DL — SIGNIFICANT CHANGE UP (ref 0.2–1.2)
BILIRUB UR-MCNC: NEGATIVE — SIGNIFICANT CHANGE UP
BLD GP AB SCN SERPL QL: NEGATIVE — SIGNIFICANT CHANGE UP
BUN SERPL-MCNC: 14 MG/DL — SIGNIFICANT CHANGE UP (ref 7–23)
CALCIUM SERPL-MCNC: 9 MG/DL — SIGNIFICANT CHANGE UP (ref 8.4–10.5)
CHLORIDE SERPL-SCNC: 106 MMOL/L — SIGNIFICANT CHANGE UP (ref 98–107)
CO2 SERPL-SCNC: 17 MMOL/L — LOW (ref 22–31)
COLOR SPEC: YELLOW — SIGNIFICANT CHANGE UP
CREAT ?TM UR-MCNC: 76 MG/DL — SIGNIFICANT CHANGE UP
CREAT SERPL-MCNC: 0.69 MG/DL — SIGNIFICANT CHANGE UP (ref 0.5–1.3)
DIFF PNL FLD: NEGATIVE — SIGNIFICANT CHANGE UP
EGFR: 116 ML/MIN/1.73M2 — SIGNIFICANT CHANGE UP
EOSINOPHIL # BLD AUTO: 0.06 K/UL — SIGNIFICANT CHANGE UP (ref 0–0.5)
EOSINOPHIL # BLD AUTO: 0.08 K/UL — SIGNIFICANT CHANGE UP (ref 0–0.5)
EOSINOPHIL NFR BLD AUTO: 1 % — SIGNIFICANT CHANGE UP (ref 0–6)
EOSINOPHIL NFR BLD AUTO: 1.4 % — SIGNIFICANT CHANGE UP (ref 0–6)
GLUCOSE BLDC GLUCOMTR-MCNC: 80 MG/DL — SIGNIFICANT CHANGE UP (ref 70–99)
GLUCOSE BLDC GLUCOMTR-MCNC: 89 MG/DL — SIGNIFICANT CHANGE UP (ref 70–99)
GLUCOSE SERPL-MCNC: 89 MG/DL — SIGNIFICANT CHANGE UP (ref 70–99)
GLUCOSE UR QL: NEGATIVE MG/DL — SIGNIFICANT CHANGE UP
HCT VFR BLD CALC: 37 % — SIGNIFICANT CHANGE UP (ref 34.5–45)
HCT VFR BLD CALC: 38.4 % — SIGNIFICANT CHANGE UP (ref 34.5–45)
HGB BLD-MCNC: 12.7 G/DL — SIGNIFICANT CHANGE UP (ref 11.5–15.5)
HGB BLD-MCNC: 12.8 G/DL — SIGNIFICANT CHANGE UP (ref 11.5–15.5)
IANC: 3.67 K/UL — SIGNIFICANT CHANGE UP (ref 1.8–7.4)
IANC: 4.02 K/UL — SIGNIFICANT CHANGE UP (ref 1.8–7.4)
IMM GRANULOCYTES NFR BLD AUTO: 0.7 % — SIGNIFICANT CHANGE UP (ref 0–0.9)
IMM GRANULOCYTES NFR BLD AUTO: 1.2 % — HIGH (ref 0–0.9)
KETONES UR-MCNC: NEGATIVE MG/DL — SIGNIFICANT CHANGE UP
LDH SERPL L TO P-CCNC: 166 U/L — SIGNIFICANT CHANGE UP (ref 135–225)
LEUKOCYTE ESTERASE UR-ACNC: NEGATIVE — SIGNIFICANT CHANGE UP
LYMPHOCYTES # BLD AUTO: 1.2 K/UL — SIGNIFICANT CHANGE UP (ref 1–3.3)
LYMPHOCYTES # BLD AUTO: 1.22 K/UL — SIGNIFICANT CHANGE UP (ref 1–3.3)
LYMPHOCYTES # BLD AUTO: 20 % — SIGNIFICANT CHANGE UP (ref 13–44)
LYMPHOCYTES # BLD AUTO: 22 % — SIGNIFICANT CHANGE UP (ref 13–44)
MCHC RBC-ENTMCNC: 28.2 PG — SIGNIFICANT CHANGE UP (ref 27–34)
MCHC RBC-ENTMCNC: 29.2 PG — SIGNIFICANT CHANGE UP (ref 27–34)
MCHC RBC-ENTMCNC: 33.1 GM/DL — SIGNIFICANT CHANGE UP (ref 32–36)
MCHC RBC-ENTMCNC: 34.6 GM/DL — SIGNIFICANT CHANGE UP (ref 32–36)
MCV RBC AUTO: 84.5 FL — SIGNIFICANT CHANGE UP (ref 80–100)
MCV RBC AUTO: 85.3 FL — SIGNIFICANT CHANGE UP (ref 80–100)
MONOCYTES # BLD AUTO: 0.52 K/UL — SIGNIFICANT CHANGE UP (ref 0–0.9)
MONOCYTES # BLD AUTO: 0.63 K/UL — SIGNIFICANT CHANGE UP (ref 0–0.9)
MONOCYTES NFR BLD AUTO: 10.5 % — SIGNIFICANT CHANGE UP (ref 2–14)
MONOCYTES NFR BLD AUTO: 9.4 % — SIGNIFICANT CHANGE UP (ref 2–14)
NEUTROPHILS # BLD AUTO: 3.67 K/UL — SIGNIFICANT CHANGE UP (ref 1.8–7.4)
NEUTROPHILS # BLD AUTO: 4.02 K/UL — SIGNIFICANT CHANGE UP (ref 1.8–7.4)
NEUTROPHILS NFR BLD AUTO: 66.1 % — SIGNIFICANT CHANGE UP (ref 43–77)
NEUTROPHILS NFR BLD AUTO: 67 % — SIGNIFICANT CHANGE UP (ref 43–77)
NITRITE UR-MCNC: NEGATIVE — SIGNIFICANT CHANGE UP
NRBC # BLD: 0 /100 WBCS — SIGNIFICANT CHANGE UP (ref 0–0)
NRBC # BLD: 0 /100 WBCS — SIGNIFICANT CHANGE UP (ref 0–0)
NRBC # FLD: 0 K/UL — SIGNIFICANT CHANGE UP (ref 0–0)
NRBC # FLD: 0 K/UL — SIGNIFICANT CHANGE UP (ref 0–0)
PH UR: 7 — SIGNIFICANT CHANGE UP (ref 5–8)
PLATELET # BLD AUTO: 208 K/UL — SIGNIFICANT CHANGE UP (ref 150–400)
PLATELET # BLD AUTO: 229 K/UL — SIGNIFICANT CHANGE UP (ref 150–400)
POTASSIUM SERPL-MCNC: 4.2 MMOL/L — SIGNIFICANT CHANGE UP (ref 3.5–5.3)
POTASSIUM SERPL-SCNC: 4.2 MMOL/L — SIGNIFICANT CHANGE UP (ref 3.5–5.3)
PROT ?TM UR-MCNC: 9 MG/DL — SIGNIFICANT CHANGE UP
PROT SERPL-MCNC: 6.8 G/DL — SIGNIFICANT CHANGE UP (ref 6–8.3)
PROT UR-MCNC: NEGATIVE MG/DL — SIGNIFICANT CHANGE UP
PROT/CREAT UR-RTO: 0.1 RATIO — SIGNIFICANT CHANGE UP (ref 0–0.2)
RBC # BLD: 4.38 M/UL — SIGNIFICANT CHANGE UP (ref 3.8–5.2)
RBC # BLD: 4.5 M/UL — SIGNIFICANT CHANGE UP (ref 3.8–5.2)
RBC # FLD: 13.2 % — SIGNIFICANT CHANGE UP (ref 10.3–14.5)
RBC # FLD: 13.5 % — SIGNIFICANT CHANGE UP (ref 10.3–14.5)
RH IG SCN BLD-IMP: POSITIVE — SIGNIFICANT CHANGE UP
SODIUM SERPL-SCNC: 136 MMOL/L — SIGNIFICANT CHANGE UP (ref 135–145)
SP GR SPEC: 1.01 — SIGNIFICANT CHANGE UP (ref 1–1.03)
URATE SERPL-MCNC: 5.7 MG/DL — SIGNIFICANT CHANGE UP (ref 2.5–7)
UROBILINOGEN FLD QL: 0.2 MG/DL — SIGNIFICANT CHANGE UP (ref 0.2–1)
WBC # BLD: 5.55 K/UL — SIGNIFICANT CHANGE UP (ref 3.8–10.5)
WBC # BLD: 6 K/UL — SIGNIFICANT CHANGE UP (ref 3.8–10.5)
WBC # FLD AUTO: 5.55 K/UL — SIGNIFICANT CHANGE UP (ref 3.8–10.5)
WBC # FLD AUTO: 6 K/UL — SIGNIFICANT CHANGE UP (ref 3.8–10.5)

## 2024-07-28 RX ORDER — BACTERIOSTATIC SODIUM CHLORIDE 0.9 %
1000 VIAL (ML) INJECTION
Refills: 0 | Status: DISCONTINUED | OUTPATIENT
Start: 2024-07-28 | End: 2024-07-29

## 2024-07-28 RX ORDER — OXYTOCIN/RINGER'S LACTATE 20/1000 ML
333.33 PLASTIC BAG, INJECTION (ML) INTRAVENOUS
Qty: 20 | Refills: 0 | Status: DISCONTINUED | OUTPATIENT
Start: 2024-07-28 | End: 2024-07-29

## 2024-07-28 RX ORDER — DEXTROSE MONOHYDRATE, SODIUM CHLORIDE, SODIUM LACTATE, CALCIUM CHLORIDE, MAGNESIUM CHLORIDE 1.5; 538; 448; 18.4; 5.08 G/100ML; MG/100ML; MG/100ML; MG/100ML; MG/100ML
1000 SOLUTION INTRAPERITONEAL
Refills: 0 | Status: DISCONTINUED | OUTPATIENT
Start: 2024-07-28 | End: 2024-07-29

## 2024-07-28 RX ORDER — DEXTROSE MONOHYDRATE, SODIUM CHLORIDE, SODIUM LACTATE, CALCIUM CHLORIDE, MAGNESIUM CHLORIDE 1.5; 538; 448; 18.4; 5.08 G/100ML; MG/100ML; MG/100ML; MG/100ML; MG/100ML
1000 SOLUTION INTRAPERITONEAL
Refills: 0 | Status: DISCONTINUED | OUTPATIENT
Start: 2024-07-28 | End: 2024-07-28

## 2024-07-28 RX ORDER — AMPICILLIN 1 G/1
1 INJECTION, POWDER, FOR SOLUTION INTRAMUSCULAR; INTRAVENOUS EVERY 4 HOURS
Refills: 0 | Status: DISCONTINUED | OUTPATIENT
Start: 2024-07-28 | End: 2024-07-29

## 2024-07-28 RX ORDER — TRISODIUM CITRATE DIHYDRATE AND CITRIC ACID MONOHYDRATE 500; 334 MG/5ML; MG/5ML
15 SOLUTION ORAL EVERY 6 HOURS
Refills: 0 | Status: DISCONTINUED | OUTPATIENT
Start: 2024-07-28 | End: 2024-07-29

## 2024-07-28 RX ORDER — AMPICILLIN 1 G/1
2 INJECTION, POWDER, FOR SOLUTION INTRAMUSCULAR; INTRAVENOUS ONCE
Refills: 0 | Status: COMPLETED | OUTPATIENT
Start: 2024-07-28 | End: 2024-07-28

## 2024-07-28 RX ORDER — OXYTOCIN/RINGER'S LACTATE 20/1000 ML
2 PLASTIC BAG, INJECTION (ML) INTRAVENOUS
Qty: 30 | Refills: 0 | Status: DISCONTINUED | OUTPATIENT
Start: 2024-07-28 | End: 2024-07-29

## 2024-07-28 RX ORDER — CHLORHEXIDINE GLUCONATE 500 MG/1
1 CLOTH TOPICAL DAILY
Refills: 0 | Status: DISCONTINUED | OUTPATIENT
Start: 2024-07-28 | End: 2024-07-29

## 2024-07-28 RX ADMIN — AMPICILLIN 200 GRAM(S): 1 INJECTION, POWDER, FOR SOLUTION INTRAMUSCULAR; INTRAVENOUS at 18:16

## 2024-07-28 RX ADMIN — AMPICILLIN 108 GRAM(S): 1 INJECTION, POWDER, FOR SOLUTION INTRAMUSCULAR; INTRAVENOUS at 22:11

## 2024-07-28 RX ADMIN — Medication 2 MILLIUNIT(S)/MIN: at 19:45

## 2024-07-28 NOTE — OB RN PATIENT PROFILE - NSSDOHHELPREAD_OBGYN_A_OB
PATIENT INSTRUCTIONS    Treatment:  Suture Removal    Suture were removed today.  If you had Steri-strip applied, these will remain attached until they come off on their own.  Keep the wound area dry for 24 hours.  You may shower after 24 hours, but do not submerge the area under water for at least one week unless otherwise instructed.  Please call us if the incision has increased in redness, swelling, warmth or drainage.    Follow-Up:  Please make an appointment with  RADHA [place] in 1 month.        Time left: 8/3/2021 1:30 PM       Please note: 24 hour notice for cancellation of appointment is required.    You may receive a survey in the mail, or via the e-mail address that you have provided.  We would appreciate if you could fill out the survey and provide us with any feedback on your experience regarding your visit today. Thank you for allowing us to provide you with your health care needs.     Do not hesitate to call if you are experiencing severe pain, worsening or change in your pain, have symptoms of infection (fever, warmth, redness, increased drainage), or have any other problem that concerns you ~ [phone] (or [phone] after hours).    Please remember when requesting refills on pain medication that the request should be made by Thursday at the latest. [facility] is open Monday-Friday, 8am-5pm, and closed on the weekends.  No narcotic refills will be filled after hours.    Additional Educational Resources:  For additional resources regarding your symptoms, diagnosis, or further health information, please visit the Health Resources section on [email or web address] or the Online Health Resources section in [employer].         no

## 2024-07-28 NOTE — OB RN PATIENT PROFILE - FALL HARM RISK - UNIVERSAL INTERVENTIONS
Bed in lowest position, wheels locked, appropriate side rails in place/Call bell, personal items and telephone in reach/Instruct patient to call for assistance before getting out of bed or chair/Non-slip footwear when patient is out of bed/[place] to call system/Physically safe environment - no spills, clutter or unnecessary equipment/Purposeful Proactive Rounding/Room/bathroom lighting operational, light cord in reach

## 2024-07-28 NOTE — OB RN DELIVERY SUMMARY - NSSELHIDDEN_OBGYN_ALL_OB_FT
[NS_DeliveryAttending1_OBGYN_ALL_OB_FT:[id number]],[NS_DeliveryAssist1_OBGYN_ALL_OB_FT:[id number]],[NS_DeliveryRN_OBGYN_ALL_OB_FT:[id number]]

## 2024-07-28 NOTE — OB PROVIDER H&P - HISTORY OF PRESENT ILLNESS
Admission H&P    Subjective  HPI: 34yoF  at 37 weeks presents for IOL for preeclampsia. +FM. -LOF. -CTXs. -VB. Pt denies any other concerns.    – PNC: Denies prenatal issues. GBS+.  EFW 2950g by clarke.  – OBHx:  x1  7lb 5oz, 1SAB  – GynHx: denies  – PMH: minimal change disease, T2DM  – PSH: cholecystectomy ()  – Psych: denies   – Social: denies   – Meds: PNV, ASA 81   – Allergies: NKDA  – Will accept blood transfusions? Yes   Admission H&P    Subjective  HPI: 34yoF  at 37 weeks presents for IOL for preeclampsia. +FM. -LOF. -CTXs. -VB. Pt denies any other concerns.    – PNC: Denies prenatal issues. GBS+.  EFW 2950g by clarke.  – OBHx:  x1  7lb 5oz, 1SAB  – GynHx: denies  – PMH: minimal change disease,  -  T2DM, diagnosed via A1C (8) in this pregnancy. Pt passed GCT in this pregnancy. Never been on medications.   – PSH: cholecystectomy ()  – Psych: denies   – Social: denies   – Meds: PNV, ASA 81   – Allergies: NKDA  – Will accept blood transfusions? Yes

## 2024-07-28 NOTE — OB RN PATIENT PROFILE - NSICDXPASTMEDICALHX_GEN_ALL_CORE_FT
PAST MEDICAL HISTORY:  Hypertension     Minimal change disease     New onset type 2 diabetes mellitus

## 2024-07-28 NOTE — OB RN PATIENT PROFILE - WEIGHT PRE-PREGNANCY IN KG
PAST MEDICAL HISTORY:  Fibromyalgia     HLD (hyperlipidemia)     HTN (hypertension)     Hypothyroidism     Thyroid cancer        55

## 2024-07-28 NOTE — OB PROVIDER H&P - NSHPPHYSICALEXAM_GEN_ALL_CORE
Objective  – Vital Signs  BP: 126/79  HR: 88  – PE:   General: NAD  Pulm: breathing comfortably on RA  Abd: gravid, nontender  Extr: moving all extremities with ease    – VE: 3/70/-3  – FHT: 140 baseline , mod variability, +accels, -decels  – Micco: irregular q3-7min  – EFW: 2950g by sono  – Sono: vertex

## 2024-07-28 NOTE — OB PROVIDER IHI INDUCTION/AUGMENTATION NOTE - NS_CHECKALL_OBGYN_ALL_OB
"Patient Name: Jewels Venegas  : 1943    MRN: [id number]                              Today's Date: 2019       Admit Date: 2019    Visit Dx:     ICD-10-CM ICD-9-CM   1. Impaired functional mobility, balance, gait, and endurance Z74.09 V49.89   2. Primary osteoarthritis of right knee M17.11 715.16   3. Impaired mobility and ADLs Z74.09 799.89     Patient Active Problem List   Diagnosis   • Post laminectomy syndrome   • S/P insertion of spinal cord stimulator   • Osteoporosis   • Lumbar stenosis with neurogenic claudication   • Polyneuropathy   • Spondylarthrosis   • Benign paroxysmal positional vertigo   • Insomnia due to medical condition   • At high risk for falls   • Physical deconditioning   • Encounter for fitting and adjustment of neuropacemaker of spinal cord   • Primary osteoarthritis of both knees   • Primary osteoarthritis of right knee   • Status post total right knee replacement   • HTN (hypertension)   • HLD (hyperlipidemia)   • Hypothyroid   • Leukocytosis, mild, likely reactive   • Acute postoperative pain   • Acute blood loss anemia, mild, asymptomatic      Past Medical History:   Diagnosis Date   • Arthritis    • Back pain    • History of kidney stones    • Hyperlipidemia     triglycerides   • Hypertension    • Osteoporosis    • Seasonal allergies    • Thyroid disease    • TIA (transient ischemic attack)     \"Possibly\"   • Urinary frequency    • Urinary urgency    • Vertigo    • Wears dentures    • Wears glasses      Past Surgical History:   Procedure Laterality Date   • APPENDECTOMY     • CARDIAC CATHETERIZATION     • CHOLECYSTECTOMY     • COLONOSCOPY     • LUMBAR LAMINECTOMY  2015    Dr. Domenico Reid, [facility] L2-5   • SPINAL CORD STIMULATOR IMPLANT N/A 10/1/2018    Procedure: SPINAL CORD STIMULATOR INSERTION PHASE 1 ;  Surgeon: Vince Olivas MD;  Location: [facility];  Service: Neurosurgery   • TONSILLECTOMY     • TOTAL KNEE ARTHROPLASTY Right " 8/20/2019    Procedure: TOTAL KNEE ARTHROPLASTY RIGHT;  Surgeon: Faustino Weinstein MD;  Location: [facility];  Service: Orthopedics     General Information     Row Name 08/22/19 1057          PT Evaluation Time/Intention    Document Type  therapy note (daily note)  -AA     Mode of Treatment  physical therapy;individual therapy  -AA     Row Name 08/22/19 1057          General Information    Patient Profile Reviewed?  yes  -AA     Existing Precautions/Restrictions  fall;other (see comments) adductor nerve cath  -AA     Row Name 08/22/19 1057          Cognitive Assessment/Intervention- PT/OT    Orientation Status (Cognition)  oriented x 4  -AA     Row Name 08/22/19 1057          Safety Issues, Functional Mobility    Safety Issues Affecting Function (Mobility)  awareness of need for assistance;insight into deficits/self awareness  -AA     Impairments Affecting Function (Mobility)  endurance/activity tolerance;pain;range of motion (ROM);strength  -AA       User Key  (r) = Recorded By, (t) = Taken By, (c) = Cosigned By    Initials Name Provider Type    AA Michelle Meyer, PT Physical Therapist        Mobility     Row Name 08/22/19 1058          Bed Mobility Assessment/Treatment    Bed Mobility Assessment/Treatment  scooting/bridging;supine-sit;sit-supine  -AA     Scooting/Bridging [place] (Bed Mobility)  conditional independence  -AA     Supine-Sit [place] (Bed Mobility)  supervision  -AA     Sit-Supine [place] (Bed Mobility)  contact guard;verbal cues  -AA     Assistive Device (Bed Mobility)  head of bed elevated;bed rails  -AA     Row Name 08/22/19 1058          Transfer Assessment/Treatment    Comment (Transfers)  VC for hand placement and RLE in front during transfers  -AA     Row Name 08/22/19 1058          Sit-Stand Transfer    Sit-Stand [place] (Transfers)  verbal cues;contact guard  -AA     Assistive Device (Sit-Stand Transfers)  walker, front-wheeled  -AA     Row Name 08/22/19 1105 08/22/19 1058        Gait/Stairs Assessment/Training    58231 - Gait Training Minutes   17  -AA  --    [place] Level (Gait)  --  contact guard;verbal cues  -AA    Assistive Device (Gait)  --  walker, front-wheeled  -AA    Distance in Feet (Gait)  --  175, 40  -AA    Deviations/Abnormal Patterns (Gait)  --  bilateral deviations;giuseppe decreased;gait speed decreased;stride length decreased;right sided deviations;antalgic  -AA    Bilateral Gait Deviations  --  heel strike decreased;forward flexed posture  -AA    Right Sided Gait Deviations  --  weight shift ability decreased  -AA    Comment (Gait/Stairs)  --  pt reports being sore this date and distance decreased.  Pt with increased antalgic gait on RLE due to soreness.  VC for proper R knee ROM and stepping  -AA    Row Name 08/22/19 1058          Mobility Assessment/Intervention    Extremity Weight-bearing Status  right lower extremity  -AA     Right Lower Extremity (Weight-bearing Status)  weight-bearing as tolerated (WBAT)  -AA       User Key  (r) = Recorded By, (t) = Taken By, (c) = Cosigned By    Initials Name Provider Type    AA Michelle Meyer, PT Physical Therapist        Obj/Interventions     Row Name 08/22/19 1101          Therapeutic Exercise    Lower Extremity (Therapeutic Exercise)  gluteal sets;LAQ (long arc quad), right;heel slides, right;quad sets, bilateral;SLR (straight leg raise), right  -AA     Lower Extremity Range of Motion (Therapeutic Exercise)  hip abduction/adduction, right;ankle dorsiflexion/plantar flexion, bilateral  -AA     Exercise Type (Therapeutic Exercise)  AROM (active range of motion)  -AA     Position (Therapeutic Exercise)  supine  -AA     Sets/Reps (Therapeutic Exercise)  15  -AA     Row Name 08/22/19 1101          Static Sitting Balance    Level of [place] (Unsupported Sitting, Static Balance)  independent  -AA     Sitting Position (Unsupported Sitting, Static Balance)  sitting on edge of bed  -AA     Time Able to Maintain Position  (Unsupported Sitting, Static Balance)  more than 5 minutes  -AA     Row Name 08/22/19 1101          Dynamic Sitting Balance    Level of [place], Reaches Outside Midline (Sitting, Dynamic Balance)  supervision  -AA     Sitting Position, Reaches Outside Midline (Sitting, Dynamic Balance)  sitting on edge of bed  -AA     Row Name 08/22/19 1101          Static Standing Balance    Level of [place] (Supported Standing, Static Balance)  standby assist  -AA     Time Able to Maintain Position (Supported Standing, Static Balance)  more than 5 minutes  -AA     Assistive Device Utilized (Supported Standing, Static Balance)  walker, rolling  -AA     Row Name 08/22/19 1101          Dynamic Standing Balance    Level of [place], Reaches Outside Midline (Standing, Dynamic Balance)  contact guard assist  -AA     Time Able to Maintain Position, Reaches Outside Midline (Standing, Dynamic Balance)  4 to 5 minutes  -     Assistive Device Utilized (Supported Standing, Dynamic Balance)  walker, rolling  -       User Key  (r) = Recorded By, (t) = Taken By, (c) = Cosigned By    Initials Name Provider Type    Michelle Farnsworth, PT Physical Therapist        Goals/Plan     Row Name 08/22/19 1102          Bed Mobility Goal 1 (PT)    Progress/Outcomes (Bed Mobility Goal 1, PT)  goal met  -AA     Row Name 08/22/19 1102          ROM Goal 1 (PT)    Progress/Outcome (ROM Goal 1, PT)  goal met  -       User Key  (r) = Recorded By, (t) = Taken By, (c) = Cosigned By    Initials Name Provider Type    Michelle Farnsworth, PT Physical Therapist        Clinical Impression     Row Name 08/22/19 1102          Pain Assessment    Additional Documentation  Pain Scale: Numbers Pre/Post-Treatment (Group)  -AA     Row Name 08/22/19 1102          Pain Scale: Numbers Pre/Post-Treatment    Pain Scale: Numbers, Pretreatment  4/10  -AA     Pain Scale: Numbers, Post-Treatment  5/10  -AA     Pain Location - Side  Right  -     Pain Location -  Orientation  generalized  -AA     Pain Location  knee  -AA     Pain Intervention(s)  Cold applied;Ambulation/increased activity;Repositioned  -AA     Row Name 08/22/19 1103 08/22/19 1102       Plan of Care Review    Plan of Care Reviewed With  patient;daughter  -AA  patient;daughter  -AA    Row Name 08/22/19 0345          Plan of Care Review    Plan of Care Reviewed With  patient  -JF     Row Name 08/22/19 1102          Positioning and Restraints    Pre-Treatment Position  in bed  -AA     Post Treatment Position  bed  -AA     In Bed  notified nsg;supine;call light within reach;encouraged to call for assist;with family/caregiver  -       User Key  (r) = Recorded By, (t) = Taken By, (c) = Cosigned By    Initials Name Provider Type    Mick Encarnacion RN Registered Nurse    Michelle Farnsworth PT Physical Therapist        Outcome Measures     Row Name 08/22/19 1105          How much help from another person do you currently need...    Turning from your back to your side while in flat bed without using bedrails?  4  -AA     Moving from lying on back to sitting on the side of a flat bed without bedrails?  4  -AA     Moving to and from a bed to a chair (including a wheelchair)?  3  -AA     Standing up from a chair using your arms (e.g., wheelchair, bedside chair)?  3  -AA     Climbing 3-5 steps with a railing?  2  -AA     To walk in hospital room?  3  -AA     AM-PAC 6 Clicks Score (PT)  19  -AA     Row Name 08/22/19 1105          Functional Assessment    Outcome Measure Options  AM-PAC 6 Clicks Basic Mobility (PT)  -       User Key  (r) = Recorded By, (t) = Taken By, (c) = Cosigned By    Initials Name Provider Type    Michelle Farnsworth PT Physical Therapist        Physical Therapy Education     Title: PT OT SLP Therapies (In Progress)     Topic: Physical Therapy (Done)     Point: Mobility training (Done)     Learning Progress Summary           Patient RACHELL Bravo D, VIOLETTE,SHAISTA,NR by RUCHI at 8/22/2019 11:03 AM    Lawrence  E,D,H, VU,NR by AA at 8/21/2019  2:03 PM    Eager, E,D,H, VU,NR by AA at 8/21/2019 11:47 AM    Acceptance, E,D, VU,NR by LR at 8/20/2019  4:19 PM    Comment:  Educated on precautions, weight bearing status, correct supine to sit t/f technique, correct sit<->stand t/f technique, correct gait mechanics, and progression of POC.   Family Eager, E,D, VU,DU,NR by AA at 8/22/2019 11:03 AM    Eager, E,D,H, VU,NR by AA at 8/21/2019  2:03 PM    Eager, E,D,H, VU,NR by AA at 8/21/2019 11:47 AM    Acceptance, E,D, VU,NR by LR at 8/20/2019  4:19 PM    Comment:  Educated on precautions, weight bearing status, correct supine to sit t/f technique, correct sit<->stand t/f technique, correct gait mechanics, and progression of POC.                   Point: Home exercise program (Done)     Learning Progress Summary           Patient Eager, E,D, VU,DU,NR by AA at 8/22/2019 11:03 AM    Eager, E,D,H, VU,NR by AA at 8/21/2019  2:03 PM    Eager, E,D,H, VU,NR by AA at 8/21/2019 11:47 AM    Acceptance, E,D, VU,NR by LR at 8/20/2019  4:19 PM    Comment:  Educated on precautions, weight bearing status, correct supine to sit t/f technique, correct sit<->stand t/f technique, correct gait mechanics, and progression of POC.   Family Eager, E,D, VU,DU,NR by AA at 8/22/2019 11:03 AM    Eager, E,D,H, VU,NR by AA at 8/21/2019  2:03 PM    Eager, E,D,H, VU,NR by AA at 8/21/2019 11:47 AM    Acceptance, E,D, VU,NR by LR at 8/20/2019  4:19 PM    Comment:  Educated on precautions, weight bearing status, correct supine to sit t/f technique, correct sit<->stand t/f technique, correct gait mechanics, and progression of POC.                   Point: Body mechanics (Done)     Learning Progress Summary           Patient RACHELL Bravo D, VU, DU,NR by AA at 8/22/2019 11:03 AM    RACHELL Bravo D, H, VU,NR by AA at 8/21/2019  2:03 PM    RACHELL Bravo D, H, VU,NR by AA at 8/21/2019 11:47 AM    RACHELL Seals D, VU,NR by LR at 8/20/2019  4:19 PM    Comment:  Educated on precautions, weight  bearing status, correct supine to sit t/f technique, correct sit<->stand t/f technique, correct gait mechanics, and progression of POC.   Family Eager, E,D, VU,DU,NR by AA at 8/22/2019 11:03 AM    Eager, E,D,H, VU,NR by AA at 8/21/2019  2:03 PM    Eager, E,D,H, VU,NR by AA at 8/21/2019 11:47 AM    Acceptance, E,D, VU,NR by LR at 8/20/2019  4:19 PM    Comment:  Educated on precautions, weight bearing status, correct supine to sit t/f technique, correct sit<->stand t/f technique, correct gait mechanics, and progression of POC.                   Point: Precautions (Done)     Learning Progress Summary           Patient Eager, E,D, VU,DU,NR by AA at 8/22/2019 11:03 AM    Eager, E,D,H, VU,NR by AA at 8/21/2019  2:03 PM    Eager, E,D,H, VU,NR by AA at 8/21/2019 11:47 AM    Acceptance, E,D, VU,NR by LR at 8/20/2019  4:19 PM    Comment:  Educated on precautions, weight bearing status, correct supine to sit t/f technique, correct sit<->stand t/f technique, correct gait mechanics, and progression of POC.   Family Eager, E,D, VU,DU,NR by AA at 8/22/2019 11:03 AM    Eager, E,D,H, VU,NR by AA at 8/21/2019  2:03 PM    Eager, E,D,H, VU,NR by AA at 8/21/2019 11:47 AM    Acceptance, E,D, VU,NR by LR at 8/20/2019  4:19 PM    Comment:  Educated on precautions, weight bearing status, correct supine to sit t/f technique, correct sit<->stand t/f technique, correct gait mechanics, and progression of POC.                               User Key     Initials Effective Dates Name Provider Type Discipline     06/19/15 -  Danisha Navarro, PT Physical Therapist PT    AA 04/02/18 -  Michelle Meyer, PT Physical Therapist PT              PT Recommendation and Plan     Outcome Summary/Treatment Plan (PT)  Anticipated Discharge Disposition (PT): inpatient rehabilitation facility  Plan of Care Reviewed With: patient, daughter  Progress: improving  Outcome Summary: Pt reports being sore this date and tired.  Pt ambulated with RW and   feet and 40 feet with cues for proper stepping and R knee ROM.  CGA for transfers with RW.  Awaiting inpatient rehab placement     Time Calculation:   PT Charges     Row Name 08/22/19 1105             Time Calculation    Start Time  0911  -AA      PT Received On  08/22/19  -AA      PT Goal Re-Cert Due Date  08/30/19 -AA         Time Calculation- PT    Total Timed Code Minutes- PT  39 minute(s)  -AA         Timed Charges    98412 - PT Therapeutic Exercise Minutes  12  -AA      57068 - Gait Training Minutes   17  -AA      54161 - PT Therapeutic Activity Minutes  10  -AA        User Key  (r) = Recorded By, (t) = Taken By, (c) = Cosigned By    Initials Name Provider Type    AA Michelle Meyer, PT Physical Therapist        Therapy Charges for Today     Code Description Service Date Service Provider Modifiers Qty    64194022741 HC PT THER PROC EA 15 MIN 8/21/2019 Michelle Meyer, PT GP 1    46016467232 HC GAIT TRAINING EA 15 MIN 8/21/2019 Mitch April ALVINO, PT GP 1    81540901756 HC GAIT TRAINING EA 15 MIN 8/21/2019 Mitch April ALVINO, PT GP 1    92973878065 HC PT THER PROC EA 15 MIN 8/21/2019 Mitch April L, PT GP 1    23739930911 HC GAIT TRAINING EA 15 MIN 8/22/2019 Mitch April ALVINO, PT  1    85416641718 HC PT THER PROC EA 15 MIN 8/22/2019 Mitch April L, PT  1    31919528907 HC PT THERAPEUTIC ACT EA 15 MIN 8/22/2019 Mitch April ALVINO, PT  1          PT G-Codes  Outcome Measure Options: AM-PAC 6 Clicks Basic Mobility (PT)  AM-PAC 6 Clicks Score (PT): 19  AM-PAC 6 Clicks Score (OT): 20    April ALVINO Meyer PT  8/22/2019        Order was written/H&P was completed/Contractions pattern was reviewed/FHR was reviewed/Induction / Augmentation was discussed

## 2024-07-28 NOTE — OB RN DELIVERY SUMMARY - NS_SEPSISRSKCALC_OBGYN_ALL_OB_FT
EOS calculated successfully. EOS Risk Factor: 0.5/1000 live births ([facility] national incidence); GA=37w1d; Temp=98.96; ROM=0.95; GBS='Positive'; Antibiotics='Broad spectrum antibiotics > 4 hrs prior to birth'

## 2024-07-28 NOTE — OB PROVIDER H&P - ASSESSMENT
34yoF  at 37 weeks presents for IOL for preeclampsia. Hx of T2DM.     Plan  1. Admit to LND. Routine Labs. IVF.  2. IOL w/ BC  3. Fetus: cat 1 tracing. VTX. EFW 2950g by sono. Continuous EFM. Sono. No concerns.  4. Prenatal issues: none  5. GBS +, for amp  6. Pain: IV pain meds/epidural PRN  7. T2DM: outpt endo consult    Patient discussed with attending physician, Dr. Ivey.  Jennie Huggins PGY1 34yoF  at 37 weeks presents for IOL for preeclampsia. PMH significant for T2DM diagnosed this pregnancy.      Plan  1. Admit to LND. Routine Labs. IVF.  2. IOL w/ BC  3. Fetus: cat 1 tracing. VTX. EFW 2950g by sono. Continuous EFM. Sono. No concerns.  4. Prenatal issues: none  5. GBS +, for amp  6. Pain: IV pain meds/epidural PRN  7. T2DM: outpt endo consult to establish care     Patient discussed with attending physician, Dr. Ivey.  Jennie Huggins PGY1

## 2024-07-28 NOTE — OB PROVIDER H&P - NSLOWPPHRISK_OBGYN_A_OB
No previous uterine incision/Holder Pregnancy/Less than or equal to 4 previous vaginal births/No known bleeding disorder/No history of postpartum hemorrhage

## 2024-07-28 NOTE — OB PROVIDER H&P - NS_LMP_OBGYN_ALL_OB_DT
You are seen today in the emergency department for epigastric pain as well as back pain.  Epigastric pain is likely due to stomach irritation.  This improved after the medication given to you in the emergency department.  Your back pain is likely related to your shingles.  A prescription for pain medication was sent to your pharmacy.  Please follow-up with your primary care provider for continued care and reevaluation.  Please return immediately to the emergency department with new or worsening symptoms.   12-Nov-2023

## 2024-07-29 DIAGNOSIS — E78.5 HYPERLIPIDEMIA, UNSPECIFIED: ICD-10-CM

## 2024-07-29 DIAGNOSIS — E11.9 TYPE 2 DIABETES MELLITUS WITHOUT COMPLICATIONS: ICD-10-CM

## 2024-07-29 DIAGNOSIS — I10 ESSENTIAL (PRIMARY) HYPERTENSION: ICD-10-CM

## 2024-07-29 LAB
ALBUMIN SERPL ELPH-MCNC: 3.5 G/DL — SIGNIFICANT CHANGE UP (ref 3.3–5)
ALP SERPL-CCNC: 162 U/L — HIGH (ref 40–120)
ALT FLD-CCNC: 55 U/L — HIGH (ref 4–33)
ANION GAP SERPL CALC-SCNC: 13 MMOL/L — SIGNIFICANT CHANGE UP (ref 7–14)
AST SERPL-CCNC: 31 U/L — SIGNIFICANT CHANGE UP (ref 4–32)
BILIRUB SERPL-MCNC: 0.2 MG/DL — SIGNIFICANT CHANGE UP (ref 0.2–1.2)
BUN SERPL-MCNC: 10 MG/DL — SIGNIFICANT CHANGE UP (ref 7–23)
CALCIUM SERPL-MCNC: 8.8 MG/DL — SIGNIFICANT CHANGE UP (ref 8.4–10.5)
CHLORIDE SERPL-SCNC: 106 MMOL/L — SIGNIFICANT CHANGE UP (ref 98–107)
CO2 SERPL-SCNC: 17 MMOL/L — LOW (ref 22–31)
CREAT SERPL-MCNC: 0.52 MG/DL — SIGNIFICANT CHANGE UP (ref 0.5–1.3)
EGFR: 124 ML/MIN/1.73M2 — SIGNIFICANT CHANGE UP
GLUCOSE BLDC GLUCOMTR-MCNC: 104 MG/DL — HIGH (ref 70–99)
GLUCOSE BLDC GLUCOMTR-MCNC: 105 MG/DL — HIGH (ref 70–99)
GLUCOSE BLDC GLUCOMTR-MCNC: 132 MG/DL — HIGH (ref 70–99)
GLUCOSE BLDC GLUCOMTR-MCNC: 77 MG/DL — SIGNIFICANT CHANGE UP (ref 70–99)
GLUCOSE SERPL-MCNC: 85 MG/DL — SIGNIFICANT CHANGE UP (ref 70–99)
LDH SERPL L TO P-CCNC: 173 U/L — SIGNIFICANT CHANGE UP (ref 135–225)
POTASSIUM SERPL-MCNC: 4 MMOL/L — SIGNIFICANT CHANGE UP (ref 3.5–5.3)
POTASSIUM SERPL-SCNC: 4 MMOL/L — SIGNIFICANT CHANGE UP (ref 3.5–5.3)
PROT SERPL-MCNC: 6.6 G/DL — SIGNIFICANT CHANGE UP (ref 6–8.3)
SODIUM SERPL-SCNC: 136 MMOL/L — SIGNIFICANT CHANGE UP (ref 135–145)
T PALLIDUM AB TITR SER: NEGATIVE — SIGNIFICANT CHANGE UP
URATE SERPL-MCNC: 5.3 MG/DL — SIGNIFICANT CHANGE UP (ref 2.5–7)

## 2024-07-29 PROCEDURE — 99254 IP/OBS CNSLTJ NEW/EST MOD 60: CPT | Mod: GC

## 2024-07-29 RX ORDER — BACTERIOSTATIC SODIUM CHLORIDE 0.9 %
3 VIAL (ML) INJECTION EVERY 8 HOURS
Refills: 0 | Status: DISCONTINUED | OUTPATIENT
Start: 2024-07-29 | End: 2024-07-31

## 2024-07-29 RX ORDER — KETOROLAC TROMETHAMINE 10 MG
30 TABLET ORAL ONCE
Refills: 0 | Status: DISCONTINUED | OUTPATIENT
Start: 2024-07-29 | End: 2024-07-29

## 2024-07-29 RX ORDER — INSULIN LISPRO 100/ML
VIAL (ML) SUBCUTANEOUS AT BEDTIME
Refills: 0 | Status: DISCONTINUED | OUTPATIENT
Start: 2024-07-29 | End: 2024-07-31

## 2024-07-29 RX ORDER — DEXTROSE MONOHYDRATE, SODIUM CHLORIDE, SODIUM LACTATE, CALCIUM CHLORIDE, MAGNESIUM CHLORIDE 1.5; 538; 448; 18.4; 5.08 G/100ML; MG/100ML; MG/100ML; MG/100ML; MG/100ML
1000 SOLUTION INTRAPERITONEAL
Refills: 0 | Status: DISCONTINUED | OUTPATIENT
Start: 2024-07-29 | End: 2024-07-31

## 2024-07-29 RX ORDER — GLUCAGON INJECTION, SOLUTION 0.5 MG/.1ML
1 INJECTION, SOLUTION SUBCUTANEOUS ONCE
Refills: 0 | Status: DISCONTINUED | OUTPATIENT
Start: 2024-07-29 | End: 2024-07-31

## 2024-07-29 RX ORDER — CLOSTRIDIUM TETANI TOXOID ANTIGEN (FORMALDEHYDE INACTIVATED), CORYNEBACTERIUM DIPHTHERIAE TOXOID ANTIGEN (FORMALDEHYDE INACTIVATED), BORDETELLA PERTUSSIS TOXOID ANTIGEN (GLUTARALDEHYDE INACTIVATED), BORDETELLA PERTUSSIS FILAMENTOUS HEMAGGLUTININ ANTIGEN (FORMALDEHYDE INACTIVATED), BORDETELLA PERTUSSIS PERTACTIN ANTIGEN, AND BORDETELLA PERTUSSIS FIMBRIAE 2/3 ANTIGEN 5; 2; 2.5; 5; 3; 5 [LF]/.5ML; [LF]/.5ML; UG/.5ML; UG/.5ML; UG/.5ML; UG/.5ML
0.5 INJECTION, SUSPENSION INTRAMUSCULAR ONCE
Refills: 0 | Status: DISCONTINUED | OUTPATIENT
Start: 2024-07-29 | End: 2024-07-31

## 2024-07-29 RX ORDER — OXYCODONE HYDROCHLORIDE 30 MG/1
5 TABLET ORAL
Refills: 0 | Status: DISCONTINUED | OUTPATIENT
Start: 2024-07-29 | End: 2024-07-31

## 2024-07-29 RX ORDER — HYDROCORTISONE 1 %
1 CREAM (GRAM) TOPICAL EVERY 6 HOURS
Refills: 0 | Status: DISCONTINUED | OUTPATIENT
Start: 2024-07-29 | End: 2024-07-31

## 2024-07-29 RX ORDER — INSULIN LISPRO 100/ML
VIAL (ML) SUBCUTANEOUS
Refills: 0 | Status: DISCONTINUED | OUTPATIENT
Start: 2024-07-29 | End: 2024-07-31

## 2024-07-29 RX ORDER — DEXTROSE 4 G
25 TABLET,CHEWABLE ORAL ONCE
Refills: 0 | Status: DISCONTINUED | OUTPATIENT
Start: 2024-07-29 | End: 2024-07-31

## 2024-07-29 RX ORDER — ACETAMINOPHEN 500 MG
1000 TABLET ORAL ONCE
Refills: 0 | Status: COMPLETED | OUTPATIENT
Start: 2024-07-29 | End: 2024-07-29

## 2024-07-29 RX ORDER — IBUPROFEN 200 MG
600 TABLET ORAL EVERY 6 HOURS
Refills: 0 | Status: DISCONTINUED | OUTPATIENT
Start: 2024-07-29 | End: 2024-07-29

## 2024-07-29 RX ORDER — ACETAMINOPHEN 500 MG
975 TABLET ORAL
Refills: 0 | Status: DISCONTINUED | OUTPATIENT
Start: 2024-07-29 | End: 2024-07-31

## 2024-07-29 RX ORDER — DIBUCAINE 1 %
1 OINTMENT (GRAM) TOPICAL EVERY 6 HOURS
Refills: 0 | Status: DISCONTINUED | OUTPATIENT
Start: 2024-07-29 | End: 2024-07-31

## 2024-07-29 RX ORDER — DIPHENHYDRAMINE HCL 25 MG
25 CAPSULE ORAL EVERY 6 HOURS
Refills: 0 | Status: DISCONTINUED | OUTPATIENT
Start: 2024-07-29 | End: 2024-07-31

## 2024-07-29 RX ORDER — CRANBERRY FRUIT EXTRACT 650 MG
1 CAPSULE ORAL DAILY
Refills: 0 | Status: DISCONTINUED | OUTPATIENT
Start: 2024-07-29 | End: 2024-07-31

## 2024-07-29 RX ORDER — DEXTROSE 4 G
12.5 TABLET,CHEWABLE ORAL ONCE
Refills: 0 | Status: DISCONTINUED | OUTPATIENT
Start: 2024-07-29 | End: 2024-07-31

## 2024-07-29 RX ORDER — OXYTOCIN/RINGER'S LACTATE 20/1000 ML
41.67 PLASTIC BAG, INJECTION (ML) INTRAVENOUS
Qty: 20 | Refills: 0 | Status: DISCONTINUED | OUTPATIENT
Start: 2024-07-29 | End: 2024-07-29

## 2024-07-29 RX ORDER — MAGNESIUM HYDROXIDE 400 MG/5ML
30 SUSPENSION, ORAL (FINAL DOSE FORM) ORAL
Refills: 0 | Status: DISCONTINUED | OUTPATIENT
Start: 2024-07-29 | End: 2024-07-31

## 2024-07-29 RX ORDER — OXYCODONE HYDROCHLORIDE 30 MG/1
5 TABLET ORAL ONCE
Refills: 0 | Status: DISCONTINUED | OUTPATIENT
Start: 2024-07-29 | End: 2024-07-31

## 2024-07-29 RX ORDER — DEXTROSE 4 G
15 TABLET,CHEWABLE ORAL ONCE
Refills: 0 | Status: DISCONTINUED | OUTPATIENT
Start: 2024-07-29 | End: 2024-07-31

## 2024-07-29 RX ORDER — SIMETHICONE 125 MG/1
80 TABLET, CHEWABLE ORAL EVERY 4 HOURS
Refills: 0 | Status: DISCONTINUED | OUTPATIENT
Start: 2024-07-29 | End: 2024-07-31

## 2024-07-29 RX ORDER — LANOLIN 100 %
1 OINTMENT (GRAM) TOPICAL EVERY 6 HOURS
Refills: 0 | Status: DISCONTINUED | OUTPATIENT
Start: 2024-07-29 | End: 2024-07-31

## 2024-07-29 RX ORDER — WITCH HAZEL 500 MG/1
1 CLOTH TOPICAL EVERY 4 HOURS
Refills: 0 | Status: DISCONTINUED | OUTPATIENT
Start: 2024-07-29 | End: 2024-07-31

## 2024-07-29 RX ADMIN — Medication 975 MILLIGRAM(S): at 21:52

## 2024-07-29 RX ADMIN — Medication 125 MILLIUNIT(S)/MIN: at 03:59

## 2024-07-29 RX ADMIN — Medication 975 MILLIGRAM(S): at 15:27

## 2024-07-29 RX ADMIN — Medication 3 MILLILITER(S): at 06:47

## 2024-07-29 RX ADMIN — Medication 975 MILLIGRAM(S): at 16:00

## 2024-07-29 RX ADMIN — Medication 3 MILLILITER(S): at 22:30

## 2024-07-29 RX ADMIN — Medication 975 MILLIGRAM(S): at 10:20

## 2024-07-29 RX ADMIN — Medication 1000 MILLIGRAM(S): at 05:00

## 2024-07-29 RX ADMIN — Medication 3 MILLILITER(S): at 14:18

## 2024-07-29 RX ADMIN — Medication 975 MILLIGRAM(S): at 09:50

## 2024-07-29 RX ADMIN — AMPICILLIN 108 GRAM(S): 1 INJECTION, POWDER, FOR SOLUTION INTRAMUSCULAR; INTRAVENOUS at 02:09

## 2024-07-29 RX ADMIN — Medication 400 MILLIGRAM(S): at 03:55

## 2024-07-29 RX ADMIN — Medication 975 MILLIGRAM(S): at 22:30

## 2024-07-29 NOTE — DISCHARGE NOTE OB - PLAN OF CARE
Routine POSTPARTUM Care Normaltensive Bps   BP monitoring 3x per day  Call office if BPs are greater than 140/90 and/or experiencing headaches, blurry vision, chest pain, or shortness or breath.

## 2024-07-29 NOTE — DISCHARGE NOTE OB - CARE PLAN
Principal Discharge DX:	 (spontaneous vaginal delivery)  Assessment and plan of treatment:	Routine POSTPARTUM Care   1 Principal Discharge DX:	 (spontaneous vaginal delivery)  Assessment and plan of treatment:	Routine POSTPARTUM Care  Secondary Diagnosis:	Preeclampsia  Assessment and plan of treatment:	Normaltensive Bps   BP monitoring 3x per day  Call office if BPs are greater than 140/90 and/or experiencing headaches, blurry vision, chest pain, or shortness or breath.

## 2024-07-29 NOTE — DISCHARGE NOTE OB - MEDICATION SUMMARY - MEDICATIONS TO STOP TAKING
I will STOP taking the medications listed below when I get home from the hospital:    oxyCODONE 5 mg oral tablet  -- 1 tab(s) by mouth every 6 hours as needed for  severe pain. For the management of severe pain you may have after your procedure MDD: 4

## 2024-07-29 NOTE — PROGRESS NOTE ADULT - ATTENDING COMMENTS
A/P: 34 yr old F PPD# 0 s/p  2024 c/b siPEC and T2DM (pregestation DM)    #siPEC   - Overnight BPs 100-120s/60-70s  - HELLP Labs- WNL   - AST/ALT   - P/C Ratio 0.1  - No antihypertensive medications at this time   - AM HEELP labs     #T2DM   - Prepregnancy A1C 8.0  - Endo consult placed today  - Low dose sliding scale     #PP   - Continue pain management  - Encourage OOB and ambulation  - Check CBC  - Continue current care

## 2024-07-29 NOTE — CONSULT NOTE ADULT - SUBJECTIVE AND OBJECTIVE BOX
DIABETES HX: Patient is a 34 year old female with past medical history of cholecystectomy, minimal change disease who presented to the hospital for labor and delivery. Pregnancy was complicated by T2DM diagnosed via A1C of 8.2% in January of this year. Endocrinology consulted for assistance with T2DM  - History/diagnosis: Patient went for routine bloodwork in January 2024 at her first prenatal visit. Her A1C resulted elevated but she was not informed of this result until April. Her blood glucose was 10.3 after her 1-hour glucose tolerance test. She was treated via lifestyle modifications during her pregnancy. She monitored glucose via fingersticks and DEXCOM. Her fasting glucose ranged from . Her 1-hour post-prandial glucose was consistently under 140. Her time in range on DEXCOM was 95% with 4% below range. Her GMI was 5.6%. Her average glucose was 96. Data from the past 90 days. States that her pregnancy was not complicated by anemia.   - Microvascular complications:  denies nephropathy, retinopathy, neuropathy  - Macrovascular complications: denies CAD, CVA  - Follows with: MF  - - Home regimen: Was on lifestyle modifications only. Was never on medication for DM.   - Family history: Multiple second-degree relatives with T2DM including aunts, uncles, cousins. No first-degree relatives with DM.     PAST MEDICAL & SURGICAL HISTORY:  Minimal change disease  Hypertension  New onset type 2 diabetes mellitus  History of cholecystectomy    FAMILY HISTORY:  FH: HTN (hypertension) (Mother)  Social History: Denies tobacco, alcohol or drug use    MEDICATIONS  (STANDING):  acetaminophen     Tablet .. 975 milliGRAM(s) Oral <User Schedule>  dextrose 5%. 1000 milliLiter(s) (100 mL/Hr) IV Continuous <Continuous>  dextrose 5%. 1000 milliLiter(s) (50 mL/Hr) IV Continuous <Continuous>  dextrose 50% Injectable 25 Gram(s) IV Push once  dextrose 50% Injectable 25 Gram(s) IV Push once  dextrose 50% Injectable 12.5 Gram(s) IV Push once  diphtheria/tetanus/pertussis (acellular) Vaccine (Adacel) 0.5 milliLiter(s) IntraMuscular once  glucagon  Injectable 1 milliGRAM(s) IntraMuscular once  insulin lispro (ADMELOG) corrective regimen sliding scale   SubCutaneous three times a day before meals  insulin lispro (ADMELOG) corrective regimen sliding scale   SubCutaneous at bedtime  prenatal multivitamin 1 Tablet(s) Oral daily  sodium chloride 0.9% lock flush 3 milliLiter(s) IV Push every 8 hours    MEDICATIONS  (PRN):  benzocaine 20%/menthol 0.5% Spray 1 Spray(s) Topical every 6 hours PRN for Perineal discomfort  dextrose Oral Gel 15 Gram(s) Oral once PRN Blood Glucose LESS THAN 70 milliGRAM(s)/deciliter  dibucaine 1% Ointment 1 Application(s) Topical every 6 hours PRN Perineal discomfort  diphenhydrAMINE 25 milliGRAM(s) Oral every 6 hours PRN Pruritus  hydrocortisone 1% Cream 1 Application(s) Topical every 6 hours PRN Moderate Pain (4-6)  lanolin Ointment 1 Application(s) Topical every 6 hours PRN nipple soreness  magnesium hydroxide Suspension 30 milliLiter(s) Oral two times a day PRN Constipation  oxyCODONE    IR 5 milliGRAM(s) Oral once PRN Moderate to Severe Pain (4-10)  oxyCODONE    IR 5 milliGRAM(s) Oral every 3 hours PRN Moderate to Severe Pain (4-10)  pramoxine 1%/zinc 5% Cream 1 Application(s) Topical every 4 hours PRN Moderate Pain (4-6)  simethicone 80 milliGRAM(s) Chew every 4 hours PRN Gas  witch hazel Pads 1 Application(s) Topical every 4 hours PRN Perineal discomfort      Allergies    No Known Allergies    Intolerances      Review of Systems:  Constitutional: No fever  Eyes: No blurry vision  Neuro: No tremors  HEENT: No pain  Cardiovascular: No chest pain, palpitations  Respiratory: No SOB, no cough  GI: No nausea, vomiting, abdominal pain  : No dysuria  Skin: no rash  Psych: no depression  Endocrine: no polyuria, polydipsia  Hem/lymph: no swelling  Osteoporosis: no fractures    ALL OTHER SYSTEMS REVIEWED AND NEGATIVE    PHYSICAL EXAM:  VITALS: T(C): 36.9 (07-29-24 @ 13:26)  T(F): 98.5 (07-29-24 @ 13:26), Max: 98.96 (07-28-24 @ 19:30)  HR: 89 (07-29-24 @ 13:26) (65 - 97)  BP: 110/77 (07-29-24 @ 13:26) (105/66 - 126/79)  RR:  (18 - 18)  SpO2:  (72% - 100%)  Wt(kg): 76.2 kg  GENERAL: NAD, well-groomed, well-developed  EYES: No proptosis, no lid lag, anicteric  HEENT:  Atraumatic, Normocephalic, moist mucous membranes  RESPIRATORY: Normal respiratory effort; no audible wheezing  SKIN: Dry, intact, No rashes or lesions  MUSCULOSKELETAL: Full range of motion, normal strength  NEURO: sensation intact, extraocular movements intact, no tremor  PSYCH: Alert and oriented x 3, normal affect, normal mood  CUSHING'S SIGNS: no striae                          12.7   5.55  )-----------( 208      ( 28 Jul 2024 23:13 )             38.4       07-28    136  |  106  |  10  ----------------------------<  85  4.0   |  17<L>  |  0.52    eGFR: 124    Ca    8.8      07-28    TPro  6.6  /  Alb  3.5  /  TBili  0.2  /  DBili  x   /  AST  31  /  ALT  55<H>  /  AlkPhos  162<H>  07-28          CAPILLARY BLOOD GLUCOSE      POCT Blood Glucose.: 104 mg/dL (29 Jul 2024 12:11)  POCT Blood Glucose.: 132 mg/dL (29 Jul 2024 08:08)  POCT Blood Glucose.: 89 mg/dL (28 Jul 2024 21:58)  POCT Blood Glucose.: 80 mg/dL (28 Jul 2024 18:05)      Thyroid Function Tests:  acetaminophen     Tablet .. 975 milliGRAM(s) Oral <User Schedule>  benzocaine 20%/menthol 0.5% Spray 1 Spray(s) Topical every 6 hours PRN  dextrose 5%. 1000 milliLiter(s) IV Continuous <Continuous>  dextrose 5%. 1000 milliLiter(s) IV Continuous <Continuous>  dextrose 50% Injectable 12.5 Gram(s) IV Push once  dextrose 50% Injectable 25 Gram(s) IV Push once  dextrose 50% Injectable 25 Gram(s) IV Push once  dextrose Oral Gel 15 Gram(s) Oral once PRN  dibucaine 1% Ointment 1 Application(s) Topical every 6 hours PRN  diphenhydrAMINE 25 milliGRAM(s) Oral every 6 hours PRN  diphtheria/tetanus/pertussis (acellular) Vaccine (Adacel) 0.5 milliLiter(s) IntraMuscular once  glucagon  Injectable 1 milliGRAM(s) IntraMuscular once  hydrocortisone 1% Cream 1 Application(s) Topical every 6 hours PRN  insulin lispro (ADMELOG) corrective regimen sliding scale   SubCutaneous three times a day before meals  insulin lispro (ADMELOG) corrective regimen sliding scale   SubCutaneous at bedtime  lanolin Ointment 1 Application(s) Topical every 6 hours PRN  magnesium hydroxide Suspension 30 milliLiter(s) Oral two times a day PRN  oxyCODONE    IR 5 milliGRAM(s) Oral every 3 hours PRN  oxyCODONE    IR 5 milliGRAM(s) Oral once PRN  pramoxine 1%/zinc 5% Cream 1 Application(s) Topical every 4 hours PRN  prenatal multivitamin 1 Tablet(s) Oral daily  simethicone 80 milliGRAM(s) Chew every 4 hours PRN  sodium chloride 0.9% lock flush 3 milliLiter(s) IV Push every 8 hours  witch hazel Pads 1 Application(s) Topical every 4 hours PRN          Radiology:          DIABETES HX: Patient is a 34 year old female with past medical history of cholecystectomy, minimal change disease who presented to the hospital for labor and delivery. Pregnancy was complicated by T2DM diagnosed via A1C of 8.2% in January of this year. Endocrinology consulted for assistance with T2DM  - History/diagnosis: Patient went for routine bloodwork in January 2024 at her first prenatal visit. Her A1C resulted elevated but she was not informed of this result until April. Her blood glucose was 103 after her 1-hour glucose tolerance test. She was treated via lifestyle modifications during her pregnancy. She monitored glucose via fingersticks and DEXCOM. Her fasting glucose ranged from . Her 1-hour post-prandial glucose was consistently under 140. Her time in range on DEXCOM was 95% with 4% below range. Her GMI was 5.6%. Her average glucose was 96. Data from the past 90 days. States that her pregnancy was not complicated by anemia.   - Microvascular complications:  denies nephropathy, retinopathy, neuropathy  - Macrovascular complications: denies CAD, CVA  - Follows with: MFM  - - Home regimen: Was on lifestyle modifications only. Was never on medication for DM.   - Family history: Multiple second-degree relatives with T2DM including aunts, uncles, cousins. No first-degree relatives with DM.     PAST MEDICAL & SURGICAL HISTORY:  Minimal change disease  Hypertension  New onset type 2 diabetes mellitus  History of cholecystectomy    FAMILY HISTORY:  FH: HTN (hypertension) (Mother)  Social History: Denies tobacco, alcohol or drug use    MEDICATIONS  (STANDING):  acetaminophen     Tablet .. 975 milliGRAM(s) Oral <User Schedule>  dextrose 5%. 1000 milliLiter(s) (100 mL/Hr) IV Continuous <Continuous>  dextrose 5%. 1000 milliLiter(s) (50 mL/Hr) IV Continuous <Continuous>  dextrose 50% Injectable 25 Gram(s) IV Push once  dextrose 50% Injectable 25 Gram(s) IV Push once  dextrose 50% Injectable 12.5 Gram(s) IV Push once  diphtheria/tetanus/pertussis (acellular) Vaccine (Adacel) 0.5 milliLiter(s) IntraMuscular once  glucagon  Injectable 1 milliGRAM(s) IntraMuscular once  insulin lispro (ADMELOG) corrective regimen sliding scale   SubCutaneous three times a day before meals  insulin lispro (ADMELOG) corrective regimen sliding scale   SubCutaneous at bedtime  prenatal multivitamin 1 Tablet(s) Oral daily  sodium chloride 0.9% lock flush 3 milliLiter(s) IV Push every 8 hours    MEDICATIONS  (PRN):  benzocaine 20%/menthol 0.5% Spray 1 Spray(s) Topical every 6 hours PRN for Perineal discomfort  dextrose Oral Gel 15 Gram(s) Oral once PRN Blood Glucose LESS THAN 70 milliGRAM(s)/deciliter  dibucaine 1% Ointment 1 Application(s) Topical every 6 hours PRN Perineal discomfort  diphenhydrAMINE 25 milliGRAM(s) Oral every 6 hours PRN Pruritus  hydrocortisone 1% Cream 1 Application(s) Topical every 6 hours PRN Moderate Pain (4-6)  lanolin Ointment 1 Application(s) Topical every 6 hours PRN nipple soreness  magnesium hydroxide Suspension 30 milliLiter(s) Oral two times a day PRN Constipation  oxyCODONE    IR 5 milliGRAM(s) Oral once PRN Moderate to Severe Pain (4-10)  oxyCODONE    IR 5 milliGRAM(s) Oral every 3 hours PRN Moderate to Severe Pain (4-10)  pramoxine 1%/zinc 5% Cream 1 Application(s) Topical every 4 hours PRN Moderate Pain (4-6)  simethicone 80 milliGRAM(s) Chew every 4 hours PRN Gas  witch hazel Pads 1 Application(s) Topical every 4 hours PRN Perineal discomfort      Allergies    No Known Allergies    Intolerances      Review of Systems:  Constitutional: No fever  Eyes: No blurry vision  Neuro: No tremors  HEENT: No pain  Cardiovascular: No chest pain, palpitations  Respiratory: No SOB, no cough  GI: No nausea, vomiting, abdominal pain  : No dysuria  Skin: no rash  Psych: no depression  Endocrine: no polyuria, polydipsia  Hem/lymph: no swelling  Osteoporosis: no fractures    ALL OTHER SYSTEMS REVIEWED AND NEGATIVE    PHYSICAL EXAM:  VITALS: T(C): 36.9 (07-29-24 @ 13:26)  T(F): 98.5 (07-29-24 @ 13:26), Max: 98.96 (07-28-24 @ 19:30)  HR: 89 (07-29-24 @ 13:26) (65 - 97)  BP: 110/77 (07-29-24 @ 13:26) (105/66 - 126/79)  RR:  (18 - 18)  SpO2:  (72% - 100%)  Wt(kg): 76.2 kg  GENERAL: NAD, well-groomed, well-developed  EYES: No proptosis, no lid lag, anicteric  HEENT:  Atraumatic, Normocephalic, moist mucous membranes  RESPIRATORY: Normal respiratory effort; no audible wheezing  SKIN: Dry, intact, No rashes or lesions  MUSCULOSKELETAL: Full range of motion, normal strength  NEURO: sensation intact, extraocular movements intact, no tremor  PSYCH: Alert and oriented x 3, normal affect, normal mood  CUSHING'S SIGNS: no striae                          12.7   5.55  )-----------( 208      ( 28 Jul 2024 23:13 )             38.4       07-28    136  |  106  |  10  ----------------------------<  85  4.0   |  17<L>  |  0.52    eGFR: 124    Ca    8.8      07-28    TPro  6.6  /  Alb  3.5  /  TBili  0.2  /  DBili  x   /  AST  31  /  ALT  55<H>  /  AlkPhos  162<H>  07-28          CAPILLARY BLOOD GLUCOSE      POCT Blood Glucose.: 104 mg/dL (29 Jul 2024 12:11)  POCT Blood Glucose.: 132 mg/dL (29 Jul 2024 08:08)  POCT Blood Glucose.: 89 mg/dL (28 Jul 2024 21:58)  POCT Blood Glucose.: 80 mg/dL (28 Jul 2024 18:05)      Thyroid Function Tests:  acetaminophen     Tablet .. 975 milliGRAM(s) Oral <User Schedule>  benzocaine 20%/menthol 0.5% Spray 1 Spray(s) Topical every 6 hours PRN  dextrose 5%. 1000 milliLiter(s) IV Continuous <Continuous>  dextrose 5%. 1000 milliLiter(s) IV Continuous <Continuous>  dextrose 50% Injectable 12.5 Gram(s) IV Push once  dextrose 50% Injectable 25 Gram(s) IV Push once  dextrose 50% Injectable 25 Gram(s) IV Push once  dextrose Oral Gel 15 Gram(s) Oral once PRN  dibucaine 1% Ointment 1 Application(s) Topical every 6 hours PRN  diphenhydrAMINE 25 milliGRAM(s) Oral every 6 hours PRN  diphtheria/tetanus/pertussis (acellular) Vaccine (Adacel) 0.5 milliLiter(s) IntraMuscular once  glucagon  Injectable 1 milliGRAM(s) IntraMuscular once  hydrocortisone 1% Cream 1 Application(s) Topical every 6 hours PRN  insulin lispro (ADMELOG) corrective regimen sliding scale   SubCutaneous three times a day before meals  insulin lispro (ADMELOG) corrective regimen sliding scale   SubCutaneous at bedtime  lanolin Ointment 1 Application(s) Topical every 6 hours PRN  magnesium hydroxide Suspension 30 milliLiter(s) Oral two times a day PRN  oxyCODONE    IR 5 milliGRAM(s) Oral every 3 hours PRN  oxyCODONE    IR 5 milliGRAM(s) Oral once PRN  pramoxine 1%/zinc 5% Cream 1 Application(s) Topical every 4 hours PRN  prenatal multivitamin 1 Tablet(s) Oral daily  simethicone 80 milliGRAM(s) Chew every 4 hours PRN  sodium chloride 0.9% lock flush 3 milliLiter(s) IV Push every 8 hours  witch hazel Pads 1 Application(s) Topical every 4 hours PRN          Radiology:

## 2024-07-29 NOTE — OB NEONATOLOGY/PEDIATRICIAN DELIVERY SUMMARY - NSPEDSNEONOTESA_OBGYN_ALL_OB_FT
Pediatrician called to delivery for categorical 2 tracing. Female infant born AGA at 37 1/7 wks via  to a 36 y/o  blood type O+ mother. Maternal history of DM type 2, minimal change disease, pre-eclampsia with proteinuria. Prenatal labs nr/immune/-, GBS + on urine 23. AROM at 1:23 on 24 with clear fluids. EOS score .06, highest maternal temperature 37.2. Baby emerged vigorous, crying. Infant was brought to radiant warmer and warmed, dried, stimulated and suctioned. HR>100, normal respiratory effort. APGARS of 9/9. Mom is initiating formula feeding. Consents to Hepatitis B vaccination.     BW: 2970  : 24  TOB: 2:15

## 2024-07-29 NOTE — OB PROVIDER DELIVERY SUMMARY - NSPROVIDERDELIVERYNOTE_OBGYN_ALL_OB_FT
Spontaneous vaginal delivery of liveborn infant from OA position. Head, shoulders, and body delivered easily. Infant passed to mother. Cord was clamped and cut. Placenta delivered spontaneously, noted to be intact. Bimanual massage with clot expressed from BLAINE. Fundus with good tone. 1000mcg of Cytotec [place].  Vaginal exam revealed intact cervix, sulci, vaginal walls. Perineum with second degree laceration, repaired in standard fashion with chromic suture. Laceration noted on anterior vagina repaired with 3-0 Vicryl. Excellent hemostasis was noted. Patient stable. Count correct x 2.

## 2024-07-29 NOTE — DISCHARGE NOTE OB - CARE PROVIDER_API CALL
Lupis Tavares  Obstetrics and Gynecology  [address] [place] [address], [address]  [place] [address]  Phone: [phone]  Fax: [phone]  Follow Up Time:

## 2024-07-29 NOTE — CONSULT NOTE ADULT - ASSESSMENT
Patient is a 34 year old female with past medical history including cholecystectomy minimal change disease, T2DM diagnosed with onset of pregnancy who presented to the hospital for labor and delivery.   Endocrinology consulted for T2DM.    #Elevated A1C  - Patient reportedly had an A1c of 8.2% in January 2024.   - This was treated with lifestyle modifications exclusively.  - Glucose was 103 on her 1-hour glucose tolerance test  - DEXCOM data not consistent with DM    Plan:  - Monitor fingersticks  - Repeat A1C ordered for tomorrow  - Continue carb-consistent diet     #Preeclampsia  - Resolved with delivery  - Monitor per OB    #HLD  - Repeat lipid panel outpatient     Pending discussion with attending physician.  Kenroy Dennison,    PGY-4 Endocrine Fellow  Can be reached via [employer] Teams.    For follow up questions, discharge recommendations or new consults, please email [email or web address] ([email or web address]) or [email or web address] ([facility]) or call the answering service at [phone] (weekdays); [phone] (nights/weekends).   For emergencies, please page fellow on call.  Patient is a 34 year old female with past medical history including cholecystectomy minimal change disease, T2DM diagnosed with onset of pregnancy who presented to the hospital for labor and delivery.   Endocrinology consulted for T2DM.    #Elevated A1C, T2DM  - Patient reportedly had an A1c of 8.2% in January 2024.   - This was treated with lifestyle modifications exclusively.  - Glucose was 103 on her 1-hour glucose tolerance test  - DEXCOM data with lifestyle modifications in place demonstrate glucose within target range or below 99% of the time    Plan:  - Continue lifestyle modifications  - Monitor fingersticks  - Repeat A1C ordered for tomorrow  - Continue carb-consistent diet   Discharge planning: Recommend repeat 2 hour/75 g oral glucose tolerance testing at 6 weeks post partum. Continue lifestyle modifications for now.     #Preeclampsia  - Resolved with delivery  - Monitor per OB    #HLD  - Repeat lipid panel outpatient     Discussed with attending physician.  Kenroy Dennison,    PGY-4 Endocrine Fellow  Can be reached via Microsoft Teams.    For follow up questions, discharge recommendations or new consults, please email [email or web address] ([email or web address]) or [email or web address] ([facility]) or call the answering service at [phone] (weekdays); [phone] (nights/weekends).   For emergencies, please page fellow on call.  Patient is a 34 year old female with past medical history including cholecystectomy minimal change disease, T2DM diagnosed with onset of pregnancy who presented to the hospital for labor and delivery.   Endocrinology consulted for T2DM.    #Elevated A1C, T2DM  - Patient reportedly had an A1c of 8.2% in January 2024.   - This was treated with lifestyle modifications exclusively.  - Glucose was 103 on her 1-hour glucose tolerance test  - DEXCOM data with lifestyle modifications in place demonstrate glucose within target range or below 99% of the time    Plan:  - Continue lifestyle modifications  - Monitor fingersticks premeal and bedtime  - Repeat A1C ordered for tomorrow  - Continue carb-consistent diet   Discharge planning: Recommend repeat 2 hour/75 g oral glucose tolerance testing at 6 weeks post partum. Continue lifestyle modifications for now.     #Preeclampsia  - Resolved with delivery  - Monitor per OB    #HLD  - Repeat lipid panel outpatient     Discussed with attending physician.  Kenroy Dennison DO   PGY-4 Endocrine Fellow  Can be reached via Microsoft Teams.    For follow up questions, discharge recommendations or new consults, please email [email or web address] ([email or web address]) or [email or web address] ([facility]) or call the answering service at [phone] (weekdays); [phone] (nights/weekends).   For emergencies, please page fellow on call.

## 2024-07-29 NOTE — DISCHARGE NOTE OB - ADDITIONAL INSTRUCTIONS
Call the office to schedule a post-partum appointment in 6 weeks. Follow up at [facility] in 3 days for BP monitoring  monitor BP @ home 3 times daily (morning/noon/night)  keep a strict blood pressure log and bring to the office 3 days  after hospital discharge for review  if you have BP > 140/90 call the office  If you have a BP >160/100 go to the hospital  if you have headaches, vision changes, upper abdominal pain call the office

## 2024-07-29 NOTE — CONSULT NOTE ADULT - ATTENDING COMMENTS
34F Noted with elevated HbA1c in pregnancy but passed OGTT and CGM with good glucose control in pregnancy without medications.  Trend glucose while in hospital before meals and bedtime.  Low Admelog scale as needed.  plan for dc off meds with OGTT at 6 weeks postpartum.    Gregorio Moseley MD  Division of Endocrinology  Pager: [phone]    If after 6PM or before 9AM, or on weekends/holidays, please call endocrine answering service for assistance ([phone]).  For nonurgent matters email [email or web address] for assistance.

## 2024-07-29 NOTE — DISCHARGE NOTE OB - PATIENT PORTAL LINK FT
You can access the FollowMyHealth Patient Portal offered by [facility] by registering at the following website: [email or web address]. By joining [facility]’s FollowMyHealth portal, you will also be able to view your health information using other applications (apps) compatible with our system.

## 2024-07-29 NOTE — DISCHARGE NOTE OB - MEDICATION SUMMARY - MEDICATIONS TO TAKE
I will START or STAY ON the medications listed below when I get home from the hospital:  None I will START or STAY ON the medications listed below when I get home from the hospital:    acetaminophen 325 mg oral tablet  -- 3 tab(s) by mouth every 8 hours as needed for  moderate pain  -- Indication: For Pain     Prenatal Multivitamins with Folic Acid 1 mg oral tablet  -- 1 tab(s) by mouth once a day  -- Indication: For Vitamins

## 2024-07-29 NOTE — OB PROVIDER DELIVERY SUMMARY - NSSELHIDDEN_OBGYN_ALL_OB_FT
[NS_DeliveryAttending1_OBGYN_ALL_OB_FT:[id number]],[NS_DeliveryAssist1_OBGYN_ALL_OB_FT:[id number]],[NS_DeliveryRN_OBGYN_ALL_OB_FT:[email or web address]],[NS_DeliveryAssist2_OBGYN_ALL_OB_FT:[id number]]

## 2024-07-30 LAB
A1C WITH ESTIMATED AVERAGE GLUCOSE RESULT: 5.1 % — SIGNIFICANT CHANGE UP (ref 4–5.6)
ALBUMIN SERPL ELPH-MCNC: 3 G/DL — LOW (ref 3.3–5)
ALP SERPL-CCNC: 135 U/L — HIGH (ref 40–120)
ALT FLD-CCNC: 43 U/L — HIGH (ref 4–33)
ANION GAP SERPL CALC-SCNC: 12 MMOL/L — SIGNIFICANT CHANGE UP (ref 7–14)
AST SERPL-CCNC: 25 U/L — SIGNIFICANT CHANGE UP (ref 4–32)
BASOPHILS # BLD AUTO: 0.03 K/UL — SIGNIFICANT CHANGE UP (ref 0–0.2)
BASOPHILS NFR BLD AUTO: 0.4 % — SIGNIFICANT CHANGE UP (ref 0–2)
BILIRUB SERPL-MCNC: <0.2 MG/DL — SIGNIFICANT CHANGE UP (ref 0.2–1.2)
BUN SERPL-MCNC: 10 MG/DL — SIGNIFICANT CHANGE UP (ref 7–23)
CALCIUM SERPL-MCNC: 8.7 MG/DL — SIGNIFICANT CHANGE UP (ref 8.4–10.5)
CHLORIDE SERPL-SCNC: 107 MMOL/L — SIGNIFICANT CHANGE UP (ref 98–107)
CO2 SERPL-SCNC: 20 MMOL/L — LOW (ref 22–31)
CREAT SERPL-MCNC: 0.6 MG/DL — SIGNIFICANT CHANGE UP (ref 0.5–1.3)
EGFR: 120 ML/MIN/1.73M2 — SIGNIFICANT CHANGE UP
EOSINOPHIL # BLD AUTO: 0.07 K/UL — SIGNIFICANT CHANGE UP (ref 0–0.5)
EOSINOPHIL NFR BLD AUTO: 1 % — SIGNIFICANT CHANGE UP (ref 0–6)
ESTIMATED AVERAGE GLUCOSE: 100 — SIGNIFICANT CHANGE UP
GLUCOSE BLDC GLUCOMTR-MCNC: 112 MG/DL — HIGH (ref 70–99)
GLUCOSE BLDC GLUCOMTR-MCNC: 114 MG/DL — HIGH (ref 70–99)
GLUCOSE BLDC GLUCOMTR-MCNC: 80 MG/DL — SIGNIFICANT CHANGE UP (ref 70–99)
GLUCOSE BLDC GLUCOMTR-MCNC: 85 MG/DL — SIGNIFICANT CHANGE UP (ref 70–99)
GLUCOSE SERPL-MCNC: 82 MG/DL — SIGNIFICANT CHANGE UP (ref 70–99)
HCT VFR BLD CALC: 36.2 % — SIGNIFICANT CHANGE UP (ref 34.5–45)
HGB BLD-MCNC: 11.7 G/DL — SIGNIFICANT CHANGE UP (ref 11.5–15.5)
IANC: 4.72 K/UL — SIGNIFICANT CHANGE UP (ref 1.8–7.4)
IMM GRANULOCYTES NFR BLD AUTO: 0.7 % — SIGNIFICANT CHANGE UP (ref 0–0.9)
LDH SERPL L TO P-CCNC: 232 U/L — HIGH (ref 135–225)
LYMPHOCYTES # BLD AUTO: 1.38 K/UL — SIGNIFICANT CHANGE UP (ref 1–3.3)
LYMPHOCYTES # BLD AUTO: 20.1 % — SIGNIFICANT CHANGE UP (ref 13–44)
MCHC RBC-ENTMCNC: 27.9 PG — SIGNIFICANT CHANGE UP (ref 27–34)
MCHC RBC-ENTMCNC: 32.3 GM/DL — SIGNIFICANT CHANGE UP (ref 32–36)
MCV RBC AUTO: 86.4 FL — SIGNIFICANT CHANGE UP (ref 80–100)
MONOCYTES # BLD AUTO: 0.6 K/UL — SIGNIFICANT CHANGE UP (ref 0–0.9)
MONOCYTES NFR BLD AUTO: 8.8 % — SIGNIFICANT CHANGE UP (ref 2–14)
NEUTROPHILS # BLD AUTO: 4.72 K/UL — SIGNIFICANT CHANGE UP (ref 1.8–7.4)
NEUTROPHILS NFR BLD AUTO: 69 % — SIGNIFICANT CHANGE UP (ref 43–77)
NRBC # BLD: 0 /100 WBCS — SIGNIFICANT CHANGE UP (ref 0–0)
NRBC # FLD: 0 K/UL — SIGNIFICANT CHANGE UP (ref 0–0)
PLATELET # BLD AUTO: 199 K/UL — SIGNIFICANT CHANGE UP (ref 150–400)
POTASSIUM SERPL-MCNC: 4.1 MMOL/L — SIGNIFICANT CHANGE UP (ref 3.5–5.3)
POTASSIUM SERPL-SCNC: 4.1 MMOL/L — SIGNIFICANT CHANGE UP (ref 3.5–5.3)
PROT SERPL-MCNC: 5.8 G/DL — LOW (ref 6–8.3)
RBC # BLD: 4.19 M/UL — SIGNIFICANT CHANGE UP (ref 3.8–5.2)
RBC # FLD: 13.5 % — SIGNIFICANT CHANGE UP (ref 10.3–14.5)
SODIUM SERPL-SCNC: 139 MMOL/L — SIGNIFICANT CHANGE UP (ref 135–145)
URATE SERPL-MCNC: 5.5 MG/DL — SIGNIFICANT CHANGE UP (ref 2.5–7)
WBC # BLD: 6.85 K/UL — SIGNIFICANT CHANGE UP (ref 3.8–10.5)
WBC # FLD AUTO: 6.85 K/UL — SIGNIFICANT CHANGE UP (ref 3.8–10.5)

## 2024-07-30 PROCEDURE — 99232 SBSQ HOSP IP/OBS MODERATE 35: CPT

## 2024-07-30 RX ORDER — ACETAMINOPHEN 500 MG
3 TABLET ORAL
Qty: 0 | Refills: 0 | DISCHARGE
Start: 2024-07-30

## 2024-07-30 RX ADMIN — Medication 975 MILLIGRAM(S): at 22:16

## 2024-07-30 RX ADMIN — Medication 975 MILLIGRAM(S): at 17:00

## 2024-07-30 RX ADMIN — Medication 975 MILLIGRAM(S): at 16:12

## 2024-07-30 RX ADMIN — Medication 975 MILLIGRAM(S): at 03:04

## 2024-07-30 RX ADMIN — Medication 975 MILLIGRAM(S): at 10:17

## 2024-07-30 RX ADMIN — Medication 3 MILLILITER(S): at 14:00

## 2024-07-30 RX ADMIN — Medication 3 MILLILITER(S): at 21:41

## 2024-07-30 RX ADMIN — Medication 975 MILLIGRAM(S): at 21:16

## 2024-07-30 RX ADMIN — Medication 975 MILLIGRAM(S): at 03:45

## 2024-07-30 RX ADMIN — Medication 975 MILLIGRAM(S): at 10:51

## 2024-07-30 NOTE — PROGRESS NOTE ADULT - SUBJECTIVE AND OBJECTIVE BOX
Post-partum Note,   She is a  34y woman who is now post-partum day: 1  **NOTE DELAYED DUE TO CLINICAL OBLIGATIONS***    Subjective:  The patient feels well.  She is ambulating.   She is tolerating regular diet.  She denies nausea and vomiting; denies fever.  She is voiding.  Her pain is controlled.  She reports normal postpartum bleeding.  She denies HA, blurry vision, vision changes.     Physical exam:    Vital Signs Last 24 Hrs  T(C): 37.1 (2024 10:07), Max: 37.1 (2024 10:07)  T(F): 98.7 (2024 10:07), Max: 98.7 (2024 10:07)  HR: 85 (2024 10:07) (71 - 89)  BP: 101/66 (2024 10:07) (101/66 - 122/65)  BP(mean): --  RR: 17 (2024 10:07) (17 - 18)  SpO2: 100% (2024 10:07) (99% - 100%)    Parameters below as of 2024 10:07  Patient On (Oxygen Delivery Method): room air        Gen: NAD  Breast: Soft, nontender, not engorged.  Abdomen: Soft, nontender, no distension , firm uterine fundus at umbilicus.  Pelvic: Normal lochia noted  Ext: No calf tenderness    LABS:                        11.7   6.85  )-----------( 199      ( 2024 05:15 )             36.2       Rubella status:     Allergies    No Known Allergies    Intolerances      MEDICATIONS  (STANDING):  acetaminophen     Tablet .. 975 milliGRAM(s) Oral <User Schedule>  dextrose 5%. 1000 milliLiter(s) (50 mL/Hr) IV Continuous <Continuous>  dextrose 5%. 1000 milliLiter(s) (100 mL/Hr) IV Continuous <Continuous>  dextrose 50% Injectable 25 Gram(s) IV Push once  dextrose 50% Injectable 25 Gram(s) IV Push once  dextrose 50% Injectable 12.5 Gram(s) IV Push once  diphtheria/tetanus/pertussis (acellular) Vaccine (Adacel) 0.5 milliLiter(s) IntraMuscular once  glucagon  Injectable 1 milliGRAM(s) IntraMuscular once  insulin lispro (ADMELOG) corrective regimen sliding scale   SubCutaneous three times a day before meals  insulin lispro (ADMELOG) corrective regimen sliding scale   SubCutaneous at bedtime  prenatal multivitamin 1 Tablet(s) Oral daily  sodium chloride 0.9% lock flush 3 milliLiter(s) IV Push every 8 hours    MEDICATIONS  (PRN):  benzocaine 20%/menthol 0.5% Spray 1 Spray(s) Topical every 6 hours PRN for Perineal discomfort  dextrose Oral Gel 15 Gram(s) Oral once PRN Blood Glucose LESS THAN 70 milliGRAM(s)/deciliter  dibucaine 1% Ointment 1 Application(s) Topical every 6 hours PRN Perineal discomfort  diphenhydrAMINE 25 milliGRAM(s) Oral every 6 hours PRN Pruritus  hydrocortisone 1% Cream 1 Application(s) Topical every 6 hours PRN Moderate Pain (4-6)  lanolin Ointment 1 Application(s) Topical every 6 hours PRN nipple soreness  magnesium hydroxide Suspension 30 milliLiter(s) Oral two times a day PRN Constipation  oxyCODONE    IR 5 milliGRAM(s) Oral once PRN Moderate to Severe Pain (4-10)  oxyCODONE    IR 5 milliGRAM(s) Oral every 3 hours PRN Moderate to Severe Pain (4-10)  pramoxine 1%/zinc 5% Cream 1 Application(s) Topical every 4 hours PRN Moderate Pain (4-6)  simethicone 80 milliGRAM(s) Chew every 4 hours PRN Gas  witch hazel Pads 1 Application(s) Topical every 4 hours PRN Perineal discomfort        Assessment and Plan  PPD #1 s/p  c/b CHTN with siPEC without SF    Doing well.  Encourage ambulation.  Encourage breastfeeding.  PP & PPD Instructions reviewed.  Pp education materials provided.   BP logs given.   Has BP cuff at home  PEC prec reviewed.   CPC.  D/C home tomorrow.  Plan reviewed with Dr. Bishop.         
    Chief Complaint: DM2 postpartum    History: feels well  tolerating po intake  no hypoglycemia events    MEDICATIONS  (STANDING):  acetaminophen     Tablet .. 975 milliGRAM(s) Oral <User Schedule>  dextrose 5%. 1000 milliLiter(s) (50 mL/Hr) IV Continuous <Continuous>  dextrose 5%. 1000 milliLiter(s) (100 mL/Hr) IV Continuous <Continuous>  dextrose 50% Injectable 12.5 Gram(s) IV Push once  dextrose 50% Injectable 25 Gram(s) IV Push once  dextrose 50% Injectable 25 Gram(s) IV Push once  diphtheria/tetanus/pertussis (acellular) Vaccine (Adacel) 0.5 milliLiter(s) IntraMuscular once  glucagon  Injectable 1 milliGRAM(s) IntraMuscular once  insulin lispro (ADMELOG) corrective regimen sliding scale   SubCutaneous three times a day before meals  insulin lispro (ADMELOG) corrective regimen sliding scale   SubCutaneous at bedtime  prenatal multivitamin 1 Tablet(s) Oral daily  sodium chloride 0.9% lock flush 3 milliLiter(s) IV Push every 8 hours    MEDICATIONS  (PRN):  benzocaine 20%/menthol 0.5% Spray 1 Spray(s) Topical every 6 hours PRN for Perineal discomfort  dextrose Oral Gel 15 Gram(s) Oral once PRN Blood Glucose LESS THAN 70 milliGRAM(s)/deciliter  dibucaine 1% Ointment 1 Application(s) Topical every 6 hours PRN Perineal discomfort  diphenhydrAMINE 25 milliGRAM(s) Oral every 6 hours PRN Pruritus  hydrocortisone 1% Cream 1 Application(s) Topical every 6 hours PRN Moderate Pain (4-6)  lanolin Ointment 1 Application(s) Topical every 6 hours PRN nipple soreness  magnesium hydroxide Suspension 30 milliLiter(s) Oral two times a day PRN Constipation  oxyCODONE    IR 5 milliGRAM(s) Oral every 3 hours PRN Moderate to Severe Pain (4-10)  oxyCODONE    IR 5 milliGRAM(s) Oral once PRN Moderate to Severe Pain (4-10)  pramoxine 1%/zinc 5% Cream 1 Application(s) Topical every 4 hours PRN Moderate Pain (4-6)  simethicone 80 milliGRAM(s) Chew every 4 hours PRN Gas  witch hazel Pads 1 Application(s) Topical every 4 hours PRN Perineal discomfort      Allergies    No Known Allergies    Intolerances      Review of Systems:    ALL OTHER SYSTEMS REVIEWED AND NEGATIVE      PHYSICAL EXAM:  VITALS: T(C): 36.9 (07-30-24 @ 14:02)  T(F): 98.4 (07-30-24 @ 14:02), Max: 98.7 (07-30-24 @ 10:07)  HR: 87 (07-30-24 @ 14:02) (71 - 87)  BP: 104/72 (07-30-24 @ 14:02) (101/66 - 122/65)  RR:  (17 - 18)  SpO2:  (99% - 100%)  Wt(kg): --  GENERAL: NAD, well-groomed, well-developed  EYES: No proptosis, anicteric  HEENT:  Atraumatic, Normocephalic, moist mucous membranes  RESPIRATORY: nonlabored respirations  PSYCH: Alert and oriented x 3, normal affect, normal mood    CAPILLARY BLOOD GLUCOSE      POCT Blood Glucose.: 80 mg/dL (30 Jul 2024 11:38)  POCT Blood Glucose.: 85 mg/dL (30 Jul 2024 07:27)  POCT Blood Glucose.: 105 mg/dL (29 Jul 2024 21:22)  POCT Blood Glucose.: 77 mg/dL (29 Jul 2024 16:43)      07-30    139  |  107  |  10  ----------------------------<  82  4.1   |  20<L>  |  0.60    eGFR: 120    Ca    8.7      07-30    TPro  5.8<L>  /  Alb  3.0<L>  /  TBili  <0.2  /  DBili  x   /  AST  25  /  ALT  43<H>  /  AlkPhos  135<H>  07-30      A1C with Estimated Average Glucose Result: 5.1 % (07-30-24 @ 05:15)      Thyroid Function Tests:

## 2024-07-31 VITALS
HEART RATE: 84 BPM | TEMPERATURE: 98 F | SYSTOLIC BLOOD PRESSURE: 121 MMHG | RESPIRATION RATE: 18 BRPM | OXYGEN SATURATION: 100 % | DIASTOLIC BLOOD PRESSURE: 71 MMHG

## 2024-07-31 LAB
GLUCOSE BLDC GLUCOMTR-MCNC: 79 MG/DL — SIGNIFICANT CHANGE UP (ref 70–99)
GLUCOSE BLDC GLUCOMTR-MCNC: 97 MG/DL — SIGNIFICANT CHANGE UP (ref 70–99)

## 2024-07-31 RX ORDER — CRANBERRY FRUIT EXTRACT 650 MG
1 CAPSULE ORAL
Qty: 0 | Refills: 0 | DISCHARGE
Start: 2024-07-31

## 2024-07-31 RX ADMIN — Medication 975 MILLIGRAM(S): at 04:21

## 2024-07-31 RX ADMIN — Medication 975 MILLIGRAM(S): at 03:21

## 2024-07-31 RX ADMIN — Medication 975 MILLIGRAM(S): at 12:30

## 2024-07-31 RX ADMIN — Medication 3 MILLILITER(S): at 06:04

## 2024-07-31 RX ADMIN — Medication 975 MILLIGRAM(S): at 11:42

## 2024-07-31 NOTE — PROGRESS NOTE ADULT - ASSESSMENT
Patient seen at bedside resting comfortably offers no current complaints.  Ambulating and voiding without difficulty.  Passing flatus and tolerating regular diet.  both breast/bottle feeding.  Denies HA, CP, SOB, N/V/D, dizziness, palpitations, worsening abdominal pain, worsening vaginal bleeding, or any other concerns.      Vital Signs Last 24 Hrs  T(C): 36.9 (2024 04:35), Max: 37.1 (2024 10:07)  T(F): 98.4 (2024 04:35), Max: 98.7 (2024 10:07)  HR: 84 (2024 04:35) (77 - 87)  BP: 121/71 (2024 04:35) (101/66 - 121/71)  BP(mean): --  RR: 18 (2024 04:35) (17 - 18)  SpO2: 100% (2024 04:35) (99% - 100%)    Parameters below as of 2024 14:02  Patient On (Oxygen Delivery Method): room air        Physical Exam:     Gen: A&Ox 3, NAD  Chest: CTA B/L  Cardiac: S1,S2; RRR  Breast: Soft, nontender, nonengorged  Abdomen: +BS; Soft, nontender, ND; Fundus firm below umbilicus  Gyn: Min lochia  Ext: Nontender, DTRS 2+, no worsening edema                          11.7   6.85  )-----------( 199      ( 2024 05:15 )             36.2        A/P: 34 yr old F PPD# 2 s/p  2024 c/b siPEC and T2DM (pregestation DM)    #siPEC   - Overnight BPs 100-120s/60-70s  - HELLP Labs- WNL   - AST/ALT maintaining at 32/58  - P/C Ratio 0.1  - No antihypertensive medications at this time     #T2DM   - Prepregnancy A1C 8.0  - Low dose sliding scale   - S/P Endo Consult (No DM medications, 2hr GTT PP)    #PP   - Continue pain management  - Discharge home with instructions  - Follow up in office within 3 days for BP check or PRN    -d/w dr Loni Torres NP
NP:  day 0 Progress Note:     Patient seen at bedside resting comfortably offers no current complaints. Ambulating and voiding without difficulty.  Passing flatus and tolerating regular diet.  Bonding well with .  Breastfeeding exclusively . Denies HA, CP, SOB, N/V/D, dizziness, palpitations,  worsening vaginal bleeding, or any other concerns.      Vital Signs Last 24 Hrs  T(C): 36.8 (2024 10:05), Max: 37.2 (2024 19:30)  T(F): 98.3 (2024 10:05), Max: 98.96 (2024 19:30)  HR: 80 (2024 10:05) (65 - 97)  BP: 113/68 (2024 10:05) (105/66 - 126/79)  BP(mean): --  RR: 18 (2024 10:05) (18 - 18)  SpO2: 98% (2024 10:05) (72% - 100%)    Parameters below as of 2024 05:45  Patient On (Oxygen Delivery Method): room air        Physical Exam:     Gen: A&Ox 3, NAD  Chest: CTA B/L  Cardiac: S1,S2; RRR  Breast: Soft, nontender, nonengorged  Abdomen: +BS, Soft, nontender, ND; Fundus firm below umbilicus  Gyn: mod lochia, intact/repaired  Ext: Nontender, DTRS 2+, no worsening edema                          12.7   5.55  )-----------( 208      ( 2024 23:13 )             38.4       A/P: 34 yr old F PPD# 0 s/p  2024 c/b siPEC and T2DM (pregestation DM)    #siPEC   - Overnight BPs 100-120s/60-70s  - HELLP Labs- WNL   - AST/ALT   - P/C Ratio 0.1  - No antihypertensive medications at this time   - AM HEELP labs     #T2DM   - Prepregnancy A1C 8.0  - Endo consult placed today  - Low dose sliding scale     #PP   - Continue pain management  - Encourage OOB and ambulation  - Check CBC  - Continue current care    -d/w dr Calvin Torres NP     
Patient is a 34 year old female with past medical history including cholecystectomy minimal change disease, T2DM diagnosed with onset of pregnancy who presented to the hospital for labor and delivery.   Endocrinology consulted for T2DM.    #Elevated A1C, T2DM  - Patient reportedly had an A1c of 8.2% in January 2024.   - This was treated with lifestyle modifications exclusively.  - Glucose was 103 on her 1-hour glucose tolerance test  - DEXCOM data with lifestyle modifications in place demonstrate glucose within target range or below 99% of the time    Plan:  - Continue lifestyle modifications  - Monitor fingersticks premeal and bedtime while in hospital - have been very well controlled 80s today without needing any correction insulin.  - Repeat HbA1C this AM 5.1% indicating good glucose control in last 3 months (and can be falsely low in pregnancy)  - Continue carb-consistent diet   Discharge planning: Recommend repeat 2 hour/75 g oral glucose tolerance testing at 6 weeks post partum to confirm current status whether patient has type 2 DM or resolved GDM. Continue lifestyle modifications for now.   Ongoing follow up with PCP as outpatient  No DM meds on discharge.    #Preeclampsia  - Resolved with delivery  - Monitor per OB    #HLD  - Repeat lipid panel outpatient     Will sign off at this time.  Please call if questions.    Gregorio Moseley MD  Division of Endocrinology  Pager: [phone]    If after 6PM or before 9AM, or on weekends/holidays, please call endocrine answering service for assistance ([phone]).  For nonurgent matters email [email or web address] for assistance.

## 2024-08-12 ENCOUNTER — EMERGENCY (EMERGENCY)
Facility: HOSPITAL | Age: 34
LOS: 1 days | Discharge: HOME | End: 2024-08-12
Attending: EMERGENCY MEDICINE | Admitting: EMERGENCY MEDICINE
Payer: COMMERCIAL

## 2024-08-12 VITALS
HEART RATE: 75 BPM | TEMPERATURE: 98 F | RESPIRATION RATE: 16 BRPM | SYSTOLIC BLOOD PRESSURE: 115 MMHG | HEIGHT: 61 IN | WEIGHT: 151.02 LBS | DIASTOLIC BLOOD PRESSURE: 92 MMHG | OXYGEN SATURATION: 98 %

## 2024-08-12 VITALS
OXYGEN SATURATION: 98 % | TEMPERATURE: 98 F | HEART RATE: 71 BPM | SYSTOLIC BLOOD PRESSURE: 121 MMHG | DIASTOLIC BLOOD PRESSURE: 81 MMHG | RESPIRATION RATE: 14 BRPM

## 2024-08-12 DIAGNOSIS — Z90.49 ACQUIRED ABSENCE OF OTHER SPECIFIED PARTS OF DIGESTIVE TRACT: Chronic | ICD-10-CM

## 2024-08-12 PROBLEM — E78.5 HYPERLIPIDEMIA, UNSPECIFIED: Chronic | Status: ACTIVE | Noted: 2024-07-29

## 2024-08-12 PROBLEM — E11.9 TYPE 2 DIABETES MELLITUS WITHOUT COMPLICATIONS: Chronic | Status: ACTIVE | Noted: 2024-07-28

## 2024-08-12 LAB
ALBUMIN SERPL ELPH-MCNC: 3.5 G/DL — SIGNIFICANT CHANGE UP (ref 3.3–5)
ALP SERPL-CCNC: 163 U/L — HIGH (ref 30–120)
ALT FLD-CCNC: 29 U/L — SIGNIFICANT CHANGE UP (ref 10–60)
ANION GAP SERPL CALC-SCNC: 7 MMOL/L — SIGNIFICANT CHANGE UP (ref 5–17)
APPEARANCE UR: CLEAR — SIGNIFICANT CHANGE UP
AST SERPL-CCNC: 18 U/L — SIGNIFICANT CHANGE UP (ref 10–40)
BACTERIA # UR AUTO: ABNORMAL /HPF
BASOPHILS # BLD AUTO: 0.04 K/UL — SIGNIFICANT CHANGE UP (ref 0–0.2)
BASOPHILS NFR BLD AUTO: 0.5 % — SIGNIFICANT CHANGE UP (ref 0–2)
BILIRUB SERPL-MCNC: 0.4 MG/DL — SIGNIFICANT CHANGE UP (ref 0.2–1.2)
BILIRUB UR-MCNC: NEGATIVE — SIGNIFICANT CHANGE UP
BUN SERPL-MCNC: 19 MG/DL — SIGNIFICANT CHANGE UP (ref 7–23)
CALCIUM SERPL-MCNC: 9 MG/DL — SIGNIFICANT CHANGE UP (ref 8.4–10.5)
CHLORIDE SERPL-SCNC: 102 MMOL/L — SIGNIFICANT CHANGE UP (ref 96–108)
CO2 SERPL-SCNC: 25 MMOL/L — SIGNIFICANT CHANGE UP (ref 22–31)
COLOR SPEC: YELLOW — SIGNIFICANT CHANGE UP
CREAT SERPL-MCNC: 0.83 MG/DL — SIGNIFICANT CHANGE UP (ref 0.5–1.3)
DIFF PNL FLD: ABNORMAL
EGFR: 94 ML/MIN/1.73M2 — SIGNIFICANT CHANGE UP
EOSINOPHIL # BLD AUTO: 0.19 K/UL — SIGNIFICANT CHANGE UP (ref 0–0.5)
EOSINOPHIL NFR BLD AUTO: 2.5 % — SIGNIFICANT CHANGE UP (ref 0–6)
EPI CELLS # UR: PRESENT
GLUCOSE SERPL-MCNC: 98 MG/DL — SIGNIFICANT CHANGE UP (ref 70–99)
GLUCOSE UR QL: NEGATIVE MG/DL — SIGNIFICANT CHANGE UP
HCG UR QL: NEGATIVE — SIGNIFICANT CHANGE UP
HCT VFR BLD CALC: 44.5 % — SIGNIFICANT CHANGE UP (ref 34.5–45)
HGB BLD-MCNC: 14.5 G/DL — SIGNIFICANT CHANGE UP (ref 11.5–15.5)
IMM GRANULOCYTES NFR BLD AUTO: 0.4 % — SIGNIFICANT CHANGE UP (ref 0–0.9)
KETONES UR-MCNC: NEGATIVE MG/DL — SIGNIFICANT CHANGE UP
LEUKOCYTE ESTERASE UR-ACNC: ABNORMAL
LYMPHOCYTES # BLD AUTO: 1.08 K/UL — SIGNIFICANT CHANGE UP (ref 1–3.3)
LYMPHOCYTES # BLD AUTO: 14.2 % — SIGNIFICANT CHANGE UP (ref 13–44)
MCHC RBC-ENTMCNC: 28.2 PG — SIGNIFICANT CHANGE UP (ref 27–34)
MCHC RBC-ENTMCNC: 32.6 GM/DL — SIGNIFICANT CHANGE UP (ref 32–36)
MCV RBC AUTO: 86.4 FL — SIGNIFICANT CHANGE UP (ref 80–100)
MONOCYTES # BLD AUTO: 0.42 K/UL — SIGNIFICANT CHANGE UP (ref 0–0.9)
MONOCYTES NFR BLD AUTO: 5.5 % — SIGNIFICANT CHANGE UP (ref 2–14)
NEUTROPHILS # BLD AUTO: 5.85 K/UL — SIGNIFICANT CHANGE UP (ref 1.8–7.4)
NEUTROPHILS NFR BLD AUTO: 76.9 % — SIGNIFICANT CHANGE UP (ref 43–77)
NITRITE UR-MCNC: NEGATIVE — SIGNIFICANT CHANGE UP
NRBC # BLD: 0 /100 WBCS — SIGNIFICANT CHANGE UP (ref 0–0)
PH UR: 6.5 — SIGNIFICANT CHANGE UP (ref 5–8)
PLATELET # BLD AUTO: 305 K/UL — SIGNIFICANT CHANGE UP (ref 150–400)
POTASSIUM SERPL-MCNC: 4.5 MMOL/L — SIGNIFICANT CHANGE UP (ref 3.5–5.3)
POTASSIUM SERPL-SCNC: 4.5 MMOL/L — SIGNIFICANT CHANGE UP (ref 3.5–5.3)
PROT SERPL-MCNC: 8.1 G/DL — SIGNIFICANT CHANGE UP (ref 6–8.3)
PROT UR-MCNC: NEGATIVE MG/DL — SIGNIFICANT CHANGE UP
RBC # BLD: 5.15 M/UL — SIGNIFICANT CHANGE UP (ref 3.8–5.2)
RBC # FLD: 12.9 % — SIGNIFICANT CHANGE UP (ref 10.3–14.5)
RBC CASTS # UR COMP ASSIST: 1 /HPF — SIGNIFICANT CHANGE UP (ref 0–4)
SODIUM SERPL-SCNC: 134 MMOL/L — LOW (ref 135–145)
SP GR SPEC: 1.02 — SIGNIFICANT CHANGE UP (ref 1–1.03)
UROBILINOGEN FLD QL: 1 MG/DL — SIGNIFICANT CHANGE UP (ref 0.2–1)
WBC # BLD: 7.61 K/UL — SIGNIFICANT CHANGE UP (ref 3.8–10.5)
WBC # FLD AUTO: 7.61 K/UL — SIGNIFICANT CHANGE UP (ref 3.8–10.5)
WBC UR QL: 6 /HPF — HIGH (ref 0–5)

## 2024-08-12 PROCEDURE — 36415 COLL VENOUS BLD VENIPUNCTURE: CPT

## 2024-08-12 PROCEDURE — 87086 URINE CULTURE/COLONY COUNT: CPT

## 2024-08-12 PROCEDURE — 81001 URINALYSIS AUTO W/SCOPE: CPT

## 2024-08-12 PROCEDURE — 87186 SC STD MICRODIL/AGAR DIL: CPT

## 2024-08-12 PROCEDURE — 80053 COMPREHEN METABOLIC PANEL: CPT

## 2024-08-12 PROCEDURE — 96365 THER/PROPH/DIAG IV INF INIT: CPT

## 2024-08-12 PROCEDURE — 96375 TX/PRO/DX INJ NEW DRUG ADDON: CPT

## 2024-08-12 PROCEDURE — 99284 EMERGENCY DEPT VISIT MOD MDM: CPT | Mod: 25

## 2024-08-12 PROCEDURE — 85025 COMPLETE CBC W/AUTO DIFF WBC: CPT

## 2024-08-12 PROCEDURE — 81025 URINE PREGNANCY TEST: CPT

## 2024-08-12 PROCEDURE — 99284 EMERGENCY DEPT VISIT MOD MDM: CPT

## 2024-08-12 RX ORDER — METOCLOPRAMIDE HCL 5 MG/ML
10 VIAL (ML) INJECTION ONCE
Refills: 0 | Status: COMPLETED | OUTPATIENT
Start: 2024-08-12 | End: 2024-08-12

## 2024-08-12 RX ORDER — KETOROLAC TROMETHAMINE 10 MG
30 TABLET ORAL ONCE
Refills: 0 | Status: DISCONTINUED | OUTPATIENT
Start: 2024-08-12 | End: 2024-08-12

## 2024-08-12 RX ORDER — BACTERIOSTATIC SODIUM CHLORIDE 0.9 %
1000 VIAL (ML) INJECTION ONCE
Refills: 0 | Status: COMPLETED | OUTPATIENT
Start: 2024-08-12 | End: 2024-08-12

## 2024-08-12 RX ADMIN — Medication 104 MILLIGRAM(S): at 13:01

## 2024-08-12 RX ADMIN — Medication 10 MILLIGRAM(S): at 13:43

## 2024-08-12 RX ADMIN — Medication 1000 MILLILITER(S): at 12:42

## 2024-08-12 RX ADMIN — Medication 30 MILLIGRAM(S): at 13:04

## 2024-08-12 RX ADMIN — Medication 30 MILLIGRAM(S): at 13:43

## 2024-08-12 RX ADMIN — Medication 1000 MILLILITER(S): at 13:45

## 2024-08-12 NOTE — ED PROVIDER NOTE - NSICDXPASTMEDICALHX_GEN_ALL_CORE_FT
PAST MEDICAL HISTORY:  Hyperlipidemia     Hypertension     Minimal change disease     New onset type 2 diabetes mellitus

## 2024-08-12 NOTE — ED ADULT NURSE NOTE - CHIEF COMPLAINT QUOTE
im 2 weeks post partum, I have a headache, im dizzy and nauseated Interpolation Flap Text: A decision was made to reconstruct the defect utilizing an interpolation axial flap and a staged reconstruction.  A telfa template was made of the defect.  This telfa template was then used to outline the interpolation flap.  The donor area for the pedicle flap was then injected with anesthesia.  The flap was excised through the skin and subcutaneous tissue down to the layer of the underlying musculature.  The interpolation flap was carefully excised within this deep plane to maintain its blood supply.  The edges of the donor site were undermined.   The donor site was closed in a primary fashion.  The pedicle was then rotated into position and sutured.  Once the tube was sutured into place, adequate blood supply was confirmed with blanching and refill.  The pedicle was then wrapped with xeroform gauze and dressed appropriately with a telfa and gauze bandage to ensure continued blood supply and protect the attached pedicle.

## 2024-08-12 NOTE — ED PROVIDER NOTE - PATIENT PORTAL LINK FT
You can access the FollowMyHealth Patient Portal offered by [facility] by registering at the following website: [email or web address]. By joining [facility]’s FollowMyHealth portal, you will also be able to view your health information using other applications (apps) compatible with our system.

## 2024-08-12 NOTE — ED PROVIDER NOTE - CLINICAL SUMMARY MEDICAL DECISION MAKING FREE TEXT BOX
34-year-old female with history of hypertension, hyperlipidemia, and gestational diabetes presents emergency room with headache that started today.  Patient is 2 weeks postpartum.  Patient states during pregnancy she had preeclampsia (no meds, reports she was just being monitored) and gestational diabetes.  Patient had vaginal delivery 2 weeks ago without any complications during the birth.  States she has been overall feeling well. Was told by her OB/GYN to closely monitor for symptoms the next 6 weeks for preeclampsia including elevated blood pressure and headaches.  Patient states her blood pressure has been good this whole time.  Started have headache today with slight nausea and lightheadedness.  History of migraines in the past.  Believes symptoms similar in  quality and character but is not certain.  States headache is diffuse and frontal in nature. did not take anything for pain or symptoms   Ob-Gyn [facility] ([place])    VSS Afebrile, NAD  HEENT - clear  PERRL EOMI  Neck supple  lungs clear  Cor S1S2 RR - MGR  Abd soft nontender, no mass or HSM, no rebound  Ext FROM intact, no edema  Neuro Intact, no deficits.  Skin Warm and dry no rash.  Imp- Headache, likely migraine. Plan - labs, meds, reassess.

## 2024-08-12 NOTE — ED PROVIDER NOTE - PROGRESS NOTE DETAILS
Patient stable.  Overall much improved.  Headache resolved.  No dizziness, lightheadedness, or nausea.  Blood work unremarkable.  No elevated blood pressure.  No evidence of preeclampsia. Will follow-up with OB/GYN and also will provide referral to neurology if headaches continue or fail to improve

## 2024-08-12 NOTE — ED PROVIDER NOTE - OBJECTIVE STATEMENT
34-year-old female with history of hypertension, hyperlipidemia, and gestational diabetes presents emergency room with headache that started today.  Patient is 2 weeks postpartum.  Patient states during pregnancy she had preeclampsia (no meds, reports she was just being monitored) and gestational diabetes.  Patient had vaginal delivery 2 weeks ago without any complications during the birth.  States she has been overall feeling well. Was told by her OB/GYN to closely monitor for symptoms the next 6 weeks for preeclampsia including elevated blood pressure and headaches.  Patient states her blood pressure has been good this whole time.  Started have headache today with slight nausea and lightheadedness.  History of migraines in the past.  Believes symptoms similar in  quality and character but is not certain.  States headache is diffuse and frontal in nature. did not take anything for pain or symptoms   [facility]

## 2024-08-12 NOTE — ED PROVIDER NOTE - CARE PROVIDER_API CALL
Linda Hong  Neurology  [address], [address]  [place] [address]  Phone: [phone]  Fax: [phone]  Follow Up Time: 1-3 Days    Diana Lauren  Neurology  [address] [place] [id number]  Phone: [phone]  Fax: [phone]  Follow Up Time: 1-3 Days

## 2024-08-12 NOTE — ED PROVIDER NOTE - NEURO NEGATIVE STATEMENT, MLM
no loss of consciousness, no gait abnormality, + headache, occ mild lightheadedness. no one sided weakness

## 2024-08-12 NOTE — ED PROVIDER NOTE - NSFOLLOWUPINSTRUCTIONS_ED_ALL_ED_FT
Tylenol or Motrin over-the-counter for pain discomfort  Drink plenty of fluids  Have close follow-up with OB/GYN  Follow-up with neurology headaches continue fail to improve, referral provided if needed.  Call to make appointment        General Headache Without Cause  A headache is pain or discomfort you feel around the head or neck area. There are many causes and types of headaches. In some cases, the cause may not be found.    Follow these instructions at home:  Watch your condition for any changes. Let your doctor know about them. Take these steps to help with your condition:    Managing pain    A bag of ice on a towel on the skin.   A heating pad for use on the painful area.   Take over-the-counter and prescription medicines only as told by your doctor. This includes medicines for pain that are taken by mouth or put on the skin.  Lie down in a dark, quiet room when you have a headache.  If told, put ice on your head and neck area:  Put ice in a plastic bag.  Place a towel between your skin and the bag.  Leave the ice on for 20 minutes, 2–3 times per day.  Take off the ice if your skin turns bright red. This is very important. If you cannot feel pain, heat, or cold, you have a greater risk of damage to the area.  If told, put heat on the affected area. Use the heat source that your doctor recommends, such as a moist heat pack or a heating pad.  Place a towel between your skin and the heat source.  Leave the heat on for 20–30 minutes.  Take off the heat if your skin turns bright red. This is very important. If you cannot feel pain, heat, or cold, you have a greater risk of getting burned.  Keep lights dim if bright lights bother you or make your headaches worse.  Eating and drinking    Eat meals on a regular schedule.  If you drink alcohol:  Limit how much you have to:  0–1 drink a day for women who are not pregnant.  0–2 drinks a day for men.  Know how much alcohol is in a drink. In the [place] one drink equals one 12 oz bottle of beer (355 mL), one 5 oz glass of wine (148 mL), or one 1½ oz glass of hard liquor (44 mL).  Stop drinking caffeine, or drink less caffeine.  General instructions    A person writing in a journal.   Keep a journal to find out if certain things bring on headaches. For example, write down:  What you eat and drink.  How much sleep you get.  Any change to your diet or medicines.  Get a massage or try other ways to relax.  Limit stress.  Sit up straight. Do not tighten (tense) your muscles.  Do not smoke or use any products that contain nicotine or tobacco. If you need help quitting, ask your doctor.  Exercise regularly as told by your doctor.  Get enough sleep. This often means 7–9 hours of sleep each night.  Keep all follow-up visits. This is important.  Contact a doctor if:  Medicine does not help your symptoms.  You have a headache that feels different than the other headaches.  You feel like you may vomit (nauseous) or you vomit.  You have a fever.  Get help right away if:  Your headache:  Gets very bad quickly.  Gets worse after a lot of physical activity.  You have any of these symptoms:  You continue to vomit.  A stiff neck.  Trouble seeing.  Your eye or ear hurts.  Trouble speaking.  Weak muscles or you lose muscle control.  You lose your balance or have trouble walking.  You feel like you will pass out (faint) or you pass out.  You are mixed up (confused).  You have a seizure.  These symptoms may be an emergency. Get help right away. Call your local emergency services (911 in the [place]  Do not wait to see if the symptoms will go away.  Do not drive yourself to the hospital.  Summary  A headache is pain or discomfort that is felt around the head or neck area.  There are many causes and types of headaches. In some cases, the cause may not be found.  Keep a journal to help find out what causes your headaches. Watch your condition for any changes. Let your doctor know about them.  Contact a doctor if you have a headache that is different from usual, or if medicine does not help your headache.  Get help right away if your headache gets very bad, you throw up, you have trouble seeing, you lose your balance, or you have a seizure.  This information is not intended to replace advice given to you by your health care provider. Make sure you discuss any questions you have with your health care provider.

## 2024-08-12 NOTE — ED PROVIDER NOTE - DIFFERENTIAL DIAGNOSIS
Differentials include but not limited to migraine headache, tension headache, cluster headache, preeclampsia Differential Diagnosis

## 2024-08-14 LAB
-  AMOXICILLIN/CLAVULANIC ACID: SIGNIFICANT CHANGE UP
-  AMPICILLIN/SULBACTAM: SIGNIFICANT CHANGE UP
-  AMPICILLIN: SIGNIFICANT CHANGE UP
-  AZTREONAM: SIGNIFICANT CHANGE UP
-  CEFAZOLIN: SIGNIFICANT CHANGE UP
-  CEFEPIME: SIGNIFICANT CHANGE UP
-  CEFOXITIN: SIGNIFICANT CHANGE UP
-  CEFTRIAXONE: SIGNIFICANT CHANGE UP
-  CEFUROXIME: SIGNIFICANT CHANGE UP
-  CIPROFLOXACIN: SIGNIFICANT CHANGE UP
-  ERTAPENEM: SIGNIFICANT CHANGE UP
-  GENTAMICIN: SIGNIFICANT CHANGE UP
-  IMIPENEM: SIGNIFICANT CHANGE UP
-  LEVOFLOXACIN: SIGNIFICANT CHANGE UP
-  MEROPENEM: SIGNIFICANT CHANGE UP
-  NITROFURANTOIN: SIGNIFICANT CHANGE UP
-  PIPERACILLIN/TAZOBACTAM: SIGNIFICANT CHANGE UP
-  TOBRAMYCIN: SIGNIFICANT CHANGE UP
-  TRIMETHOPRIM/SULFAMETHOXAZOLE: SIGNIFICANT CHANGE UP
CULTURE RESULTS: ABNORMAL
METHOD TYPE: SIGNIFICANT CHANGE UP
ORGANISM # SPEC MICROSCOPIC CNT: ABNORMAL
ORGANISM # SPEC MICROSCOPIC CNT: ABNORMAL
SPECIMEN SOURCE: SIGNIFICANT CHANGE UP

## 2024-08-15 RX ORDER — CEFUROXIME AXETIL 250 MG
1 TABLET ORAL
Qty: 14 | Refills: 0
Start: 2024-08-15 | End: 2024-08-21

## 2025-04-07 ENCOUNTER — OUTPATIENT (OUTPATIENT)
Dept: OUTPATIENT SERVICES | Facility: HOSPITAL | Age: 35
LOS: 1 days | End: 2025-04-07
Payer: COMMERCIAL

## 2025-04-07 ENCOUNTER — APPOINTMENT (OUTPATIENT)
Dept: ULTRASOUND IMAGING | Facility: CLINIC | Age: 35
End: 2025-04-07
Payer: COMMERCIAL

## 2025-04-07 DIAGNOSIS — Z90.49 ACQUIRED ABSENCE OF OTHER SPECIFIED PARTS OF DIGESTIVE TRACT: Chronic | ICD-10-CM

## 2025-04-07 DIAGNOSIS — Z00.8 ENCOUNTER FOR OTHER GENERAL EXAMINATION: ICD-10-CM

## 2025-04-07 DIAGNOSIS — R92.8 OTHER ABNORMAL AND INCONCLUSIVE FINDINGS ON DIAGNOSTIC IMAGING OF BREAST: ICD-10-CM

## 2025-04-07 DIAGNOSIS — Z12.31 ENCOUNTER FOR SCREENING MAMMOGRAM FOR MALIGNANT NEOPLASM OF BREAST: ICD-10-CM

## 2025-04-07 PROCEDURE — 77066 DX MAMMO INCL CAD BI: CPT

## 2025-04-07 PROCEDURE — G0279: CPT

## 2025-04-07 PROCEDURE — 77066 DX MAMMO INCL CAD BI: CPT | Mod: 26

## 2025-04-07 PROCEDURE — 76641 ULTRASOUND BREAST COMPLETE: CPT | Mod: 26,50

## 2025-04-07 PROCEDURE — 76641 ULTRASOUND BREAST COMPLETE: CPT

## 2025-04-07 PROCEDURE — G0279: CPT | Mod: 26

## 2025-04-16 ENCOUNTER — RESULT REVIEW (OUTPATIENT)
Age: 35
End: 2025-04-16

## 2025-04-16 ENCOUNTER — APPOINTMENT (OUTPATIENT)
Dept: ULTRASOUND IMAGING | Facility: CLINIC | Age: 35
End: 2025-04-16
Payer: COMMERCIAL

## 2025-04-16 ENCOUNTER — OUTPATIENT (OUTPATIENT)
Dept: OUTPATIENT SERVICES | Facility: HOSPITAL | Age: 35
LOS: 1 days | End: 2025-04-16
Payer: COMMERCIAL

## 2025-04-16 DIAGNOSIS — Z00.8 ENCOUNTER FOR OTHER GENERAL EXAMINATION: ICD-10-CM

## 2025-04-16 DIAGNOSIS — Z90.49 ACQUIRED ABSENCE OF OTHER SPECIFIED PARTS OF DIGESTIVE TRACT: Chronic | ICD-10-CM

## 2025-04-16 PROCEDURE — 38505 NEEDLE BIOPSY LYMPH NODES: CPT

## 2025-04-16 PROCEDURE — 19083 BX BREAST 1ST LESION US IMAG: CPT

## 2025-04-16 PROCEDURE — 38505 NEEDLE BIOPSY LYMPH NODES: CPT | Mod: RT

## 2025-04-16 PROCEDURE — 19083 BX BREAST 1ST LESION US IMAG: CPT | Mod: RT

## 2025-04-16 PROCEDURE — 76942 ECHO GUIDE FOR BIOPSY: CPT

## 2025-04-16 PROCEDURE — 77065 DX MAMMO INCL CAD UNI: CPT | Mod: 26,RT

## 2025-04-16 PROCEDURE — 77065 DX MAMMO INCL CAD UNI: CPT

## 2025-04-16 PROCEDURE — A4648: CPT

## 2025-06-23 NOTE — ED ADULT TRIAGE NOTE - CHIEF COMPLAINT QUOTE
EMG left lower extremity completed   im 2 weeks post partum, I have a headache, im dizzy and nauseated

## 2025-08-10 ENCOUNTER — EMERGENCY (EMERGENCY)
Facility: HOSPITAL | Age: 35
LOS: 1 days | End: 2025-08-10
Attending: EMERGENCY MEDICINE | Admitting: EMERGENCY MEDICINE
Payer: COMMERCIAL

## 2025-08-10 VITALS
HEART RATE: 84 BPM | HEIGHT: 61 IN | SYSTOLIC BLOOD PRESSURE: 124 MMHG | TEMPERATURE: 98 F | DIASTOLIC BLOOD PRESSURE: 88 MMHG | WEIGHT: 142.42 LBS | OXYGEN SATURATION: 100 % | RESPIRATION RATE: 19 BRPM

## 2025-08-10 VITALS
OXYGEN SATURATION: 96 % | TEMPERATURE: 98 F | DIASTOLIC BLOOD PRESSURE: 85 MMHG | HEART RATE: 78 BPM | SYSTOLIC BLOOD PRESSURE: 108 MMHG | RESPIRATION RATE: 17 BRPM

## 2025-08-10 DIAGNOSIS — Z90.49 ACQUIRED ABSENCE OF OTHER SPECIFIED PARTS OF DIGESTIVE TRACT: Chronic | ICD-10-CM

## 2025-08-10 LAB
ALBUMIN SERPL ELPH-MCNC: 4.2 G/DL — SIGNIFICANT CHANGE UP (ref 3.3–5)
ALP SERPL-CCNC: 91 U/L — SIGNIFICANT CHANGE UP (ref 30–120)
ALT FLD-CCNC: 27 U/L — SIGNIFICANT CHANGE UP (ref 10–60)
ANION GAP SERPL CALC-SCNC: 6 MMOL/L — SIGNIFICANT CHANGE UP (ref 5–17)
AST SERPL-CCNC: 17 U/L — SIGNIFICANT CHANGE UP (ref 10–40)
BASOPHILS # BLD AUTO: 0.04 K/UL — SIGNIFICANT CHANGE UP (ref 0–0.2)
BASOPHILS NFR BLD AUTO: 0.6 % — SIGNIFICANT CHANGE UP (ref 0–2)
BILIRUB SERPL-MCNC: 0.3 MG/DL — SIGNIFICANT CHANGE UP (ref 0.2–1.2)
BUN SERPL-MCNC: 14 MG/DL — SIGNIFICANT CHANGE UP (ref 7–23)
CALCIUM SERPL-MCNC: 9.7 MG/DL — SIGNIFICANT CHANGE UP (ref 8.4–10.5)
CHLORIDE SERPL-SCNC: 101 MMOL/L — SIGNIFICANT CHANGE UP (ref 96–108)
CO2 SERPL-SCNC: 29 MMOL/L — SIGNIFICANT CHANGE UP (ref 22–31)
CREAT SERPL-MCNC: 0.79 MG/DL — SIGNIFICANT CHANGE UP (ref 0.5–1.3)
D DIMER BLD IA.RAPID-MCNC: 184 NG/ML DDU — SIGNIFICANT CHANGE UP
EGFR: 99 ML/MIN/1.73M2 — SIGNIFICANT CHANGE UP
EGFR: 99 ML/MIN/1.73M2 — SIGNIFICANT CHANGE UP
EOSINOPHIL # BLD AUTO: 0.3 K/UL — SIGNIFICANT CHANGE UP (ref 0–0.5)
EOSINOPHIL NFR BLD AUTO: 4.3 % — SIGNIFICANT CHANGE UP (ref 0–6)
GLUCOSE SERPL-MCNC: 114 MG/DL — HIGH (ref 70–99)
HCG UR QL: NEGATIVE — SIGNIFICANT CHANGE UP
HCT VFR BLD CALC: 43.1 % — SIGNIFICANT CHANGE UP (ref 34.5–45)
HGB BLD-MCNC: 14 G/DL — SIGNIFICANT CHANGE UP (ref 11.5–15.5)
IMM GRANULOCYTES # BLD AUTO: 0.03 K/UL — SIGNIFICANT CHANGE UP (ref 0–0.07)
IMM GRANULOCYTES NFR BLD AUTO: 0.4 % — SIGNIFICANT CHANGE UP (ref 0–0.9)
LYMPHOCYTES # BLD AUTO: 2.62 K/UL — SIGNIFICANT CHANGE UP (ref 1–3.3)
LYMPHOCYTES NFR BLD AUTO: 37.4 % — SIGNIFICANT CHANGE UP (ref 13–44)
MCHC RBC-ENTMCNC: 27.6 PG — SIGNIFICANT CHANGE UP (ref 27–34)
MCHC RBC-ENTMCNC: 32.5 G/DL — SIGNIFICANT CHANGE UP (ref 32–36)
MCV RBC AUTO: 84.8 FL — SIGNIFICANT CHANGE UP (ref 80–100)
MONOCYTES # BLD AUTO: 0.84 K/UL — SIGNIFICANT CHANGE UP (ref 0–0.9)
MONOCYTES NFR BLD AUTO: 12 % — SIGNIFICANT CHANGE UP (ref 2–14)
NEUTROPHILS # BLD AUTO: 3.18 K/UL — SIGNIFICANT CHANGE UP (ref 1.8–7.4)
NEUTROPHILS NFR BLD AUTO: 45.3 % — SIGNIFICANT CHANGE UP (ref 43–77)
NRBC # BLD AUTO: 0 K/UL — SIGNIFICANT CHANGE UP (ref 0–0)
NRBC # FLD: 0 K/UL — SIGNIFICANT CHANGE UP (ref 0–0)
NRBC BLD AUTO-RTO: 0 /100 WBCS — SIGNIFICANT CHANGE UP (ref 0–0)
PLATELET # BLD AUTO: 307 K/UL — SIGNIFICANT CHANGE UP (ref 150–400)
PMV BLD: 9.2 FL — SIGNIFICANT CHANGE UP (ref 7–13)
POTASSIUM SERPL-MCNC: 4.4 MMOL/L — SIGNIFICANT CHANGE UP (ref 3.5–5.3)
POTASSIUM SERPL-SCNC: 4.4 MMOL/L — SIGNIFICANT CHANGE UP (ref 3.5–5.3)
PROT SERPL-MCNC: 8.4 G/DL — HIGH (ref 6–8.3)
RBC # BLD: 5.08 M/UL — SIGNIFICANT CHANGE UP (ref 3.8–5.2)
RBC # FLD: 12.9 % — SIGNIFICANT CHANGE UP (ref 10.3–14.5)
SODIUM SERPL-SCNC: 136 MMOL/L — SIGNIFICANT CHANGE UP (ref 135–145)
TROPONIN I, HIGH SENSITIVITY RESULT: <4 NG/L — SIGNIFICANT CHANGE UP
TROPONIN I, HIGH SENSITIVITY RESULT: <4 NG/L — SIGNIFICANT CHANGE UP
WBC # BLD: 7.01 K/UL — SIGNIFICANT CHANGE UP (ref 3.8–10.5)
WBC # FLD AUTO: 7.01 K/UL — SIGNIFICANT CHANGE UP (ref 3.8–10.5)

## 2025-08-10 PROCEDURE — 80053 COMPREHEN METABOLIC PANEL: CPT

## 2025-08-10 PROCEDURE — 93005 ELECTROCARDIOGRAM TRACING: CPT

## 2025-08-10 PROCEDURE — 99284 EMERGENCY DEPT VISIT MOD MDM: CPT

## 2025-08-10 PROCEDURE — 84484 ASSAY OF TROPONIN QUANT: CPT

## 2025-08-10 PROCEDURE — 85379 FIBRIN DEGRADATION QUANT: CPT

## 2025-08-10 PROCEDURE — 36415 COLL VENOUS BLD VENIPUNCTURE: CPT

## 2025-08-10 PROCEDURE — 71046 X-RAY EXAM CHEST 2 VIEWS: CPT | Mod: 26

## 2025-08-10 PROCEDURE — 85025 COMPLETE CBC W/AUTO DIFF WBC: CPT

## 2025-08-10 PROCEDURE — 99285 EMERGENCY DEPT VISIT HI MDM: CPT | Mod: 25

## 2025-08-10 PROCEDURE — 93010 ELECTROCARDIOGRAM REPORT: CPT

## 2025-08-10 PROCEDURE — 71046 X-RAY EXAM CHEST 2 VIEWS: CPT

## 2025-08-10 PROCEDURE — 81025 URINE PREGNANCY TEST: CPT

## 2025-08-10 PROCEDURE — 96365 THER/PROPH/DIAG IV INF INIT: CPT

## 2025-08-10 RX ORDER — ACETAMINOPHEN 500 MG/5ML
1000 LIQUID (ML) ORAL ONCE
Refills: 0 | Status: COMPLETED | OUTPATIENT
Start: 2025-08-10 | End: 2025-08-10

## 2025-08-10 RX ADMIN — Medication 400 MILLIGRAM(S): at 20:26

## 2025-08-10 RX ADMIN — Medication 1000 MILLIGRAM(S): at 20:59

## (undated) DEVICE — SUT POLYSORB 0 30" GU-46

## (undated) DEVICE — DRSG STERISTRIPS 0.5 X 4"

## (undated) DEVICE — TROCAR ETHICON ENDOPATH XCEL UNIVERSAL SLEEVE WITH OPTIVIEW 5MM X 100MM

## (undated) DEVICE — SUT POLYSORB 3-0 30" V-20 UNDYED

## (undated) DEVICE — BLADE SURGICAL #15 CARBON

## (undated) DEVICE — D HELP - CLEARVIEW CLEARIFY SYSTEM

## (undated) DEVICE — SMOKE EVACUTATION SYS LAPROSCOPIC AC/PA

## (undated) DEVICE — GLV 8 PROTEXIS (BLUE)

## (undated) DEVICE — GLV 7.5 PROTEXIS (WHITE)

## (undated) DEVICE — PACK GENERAL LAPAROSCOPY

## (undated) DEVICE — TUBING STRYKEFLOW II SUCTION / IRRIGATOR

## (undated) DEVICE — VENODYNE/SCD SLEEVE CALF MEDIUM

## (undated) DEVICE — ELCTR BOVIE TIP BLADE INSULATED 2.75" EDGE

## (undated) DEVICE — WARMING BLANKET UPPER ADULT

## (undated) DEVICE — TROCAR ETHICON ENDOPATH XCEL BLADELESS 5MM X 100MM STABILITY

## (undated) DEVICE — TUBING STRYKER PNEUMOSURE HI FLOW RTP

## (undated) DEVICE — DRAPE 3/4 SHEET 52X76"

## (undated) DEVICE — SPONGE ENDO PEANUT 5MM

## (undated) DEVICE — DRSG MASTISOL

## (undated) DEVICE — SOL IRR BAG NS 0.9% 3000ML

## (undated) DEVICE — ENDOCATCH 10MM SPECIMEN POUCH

## (undated) DEVICE — SOL IRR POUR NS 0.9% 1000ML

## (undated) DEVICE — SUT MAXON 4-0 30" P-12

## (undated) DEVICE — TROCAR ETHICON ENDOPATH XCEL BLADELESS 11MM X 100MM STABILITY

## (undated) DEVICE — NDL HYPO SAFE 22G X 1.5" (BLACK)